# Patient Record
Sex: MALE | Race: WHITE | NOT HISPANIC OR LATINO | URBAN - METROPOLITAN AREA
[De-identification: names, ages, dates, MRNs, and addresses within clinical notes are randomized per-mention and may not be internally consistent; named-entity substitution may affect disease eponyms.]

---

## 2017-03-26 DIAGNOSIS — Z76.0 ENCOUNTER FOR MEDICATION REFILL: ICD-10-CM

## 2017-03-26 DIAGNOSIS — I10 ESSENTIAL HYPERTENSION: ICD-10-CM

## 2017-03-26 RX ORDER — ALPRAZOLAM 0.5 MG
TABLET ORAL
Qty: 90 TABLET | Refills: 0 | Status: SHIPPED | OUTPATIENT
Start: 2017-03-26 | End: 2017-04-05

## 2017-03-26 RX ORDER — LISINOPRIL 40 MG/1
TABLET ORAL
Qty: 30 TABLET | Refills: 0 | Status: SHIPPED | OUTPATIENT
Start: 2017-03-26 | End: 2017-05-05

## 2017-04-05 DIAGNOSIS — Z76.0 ENCOUNTER FOR MEDICATION REFILL: ICD-10-CM

## 2017-04-06 RX ORDER — ALPRAZOLAM 0.5 MG
TABLET ORAL
Qty: 90 TABLET | Refills: 0 | Status: SHIPPED | OUTPATIENT
Start: 2017-04-06 | End: 2017-08-01

## 2017-04-21 ENCOUNTER — OFFICE VISIT (OUTPATIENT)
Dept: FAMILY MEDICINE | Facility: CLINIC | Age: 37
End: 2017-04-21

## 2017-04-21 VITALS
WEIGHT: 160 LBS | SYSTOLIC BLOOD PRESSURE: 130 MMHG | OXYGEN SATURATION: 99 % | HEART RATE: 108 BPM | DIASTOLIC BLOOD PRESSURE: 88 MMHG | BODY MASS INDEX: 24.15 KG/M2

## 2017-04-21 DIAGNOSIS — N41.9 PROSTATITIS, UNSPECIFIED PROSTATITIS TYPE: Primary | ICD-10-CM

## 2017-04-21 LAB
BACTERIA URINE: NORMAL
BILIRUB UR QL STRIP: 0
BLOOD URINE DIP: 0
CASTS/LPF: NORMAL
COLOR UR: YELLOW
CRYSTAL URINE: NORMAL
EPITHELIAL CELLS - QUEST: NORMAL
GLUCOSE UR STRIP-MCNC: 0 MG/DL
KETONES UR QL STRIP: 0
LEUKOCYTE ESTERASE URINE DIP: 0
MUCOUS URINE: NORMAL
NITRITE UR QL STRIP: NORMAL
PH UR STRIP: 7.5 PH (ref 5–9)
PROT UR QL: 0 MG/DL (ref ?–0.01)
RBC URINE: NORMAL (ref 0–3)
SP GR UR STRIP: 1.01 (ref 1–1.02)
UROBILINOGEN UR QL STRIP: 0.2 EU/DL (ref 0.2–1)
WBC URINE: NORMAL (ref 0–3)

## 2017-04-21 PROCEDURE — 99214 OFFICE O/P EST MOD 30 MIN: CPT | Performed by: FAMILY MEDICINE

## 2017-04-21 PROCEDURE — 81003 URINALYSIS AUTO W/O SCOPE: CPT | Performed by: FAMILY MEDICINE

## 2017-04-21 RX ORDER — DOXYCYCLINE HYCLATE 100 MG
100 TABLET ORAL 2 TIMES DAILY
Qty: 20 TABLET | Refills: 0 | Status: SHIPPED | OUTPATIENT
Start: 2017-04-21 | End: 2018-02-05

## 2017-04-21 NOTE — PROGRESS NOTES
Concerned about prostatitis with 1+ day of mild low abdominal discomfort, frequent urination. Similar to 4 years ago when he had episode of prolonged discomfort, despite Levaquin. No dysuria. No fever or chills. Monogamous. Sense of perineal fullness. Father with hx of prostate cancer in his late 50's, screening.  ROS: no constipation or diarrhea  OBJECTIVE: /88  Pulse 108  Wt 72.6 kg (160 lb)  SpO2 99%  BMI 24.15 kg/m2 NAD WDWN. NAD. Abdomen is soft and perhaps slightly tender in the lower abdomen. First urine UA is normal.  (N41.9) Prostatitis, unspecified prostatitis type  (primary encounter diagnosis)  Comment: perhaps chemical  Plan: Urinalysis w/reflex protein, bili ([facility]),         doxycycline (VIBRA-TABS) 100 MG tablet        Use antibiotic if not better in a few days, for 10 days

## 2017-04-21 NOTE — MR AVS SNAPSHOT
After Visit Summary   4/21/2017    Jason Israel    MRN: [id number]           Patient Information     Date Of Birth          1980        Visit Information        Provider Department      4/21/2017 4:15 PM Bhavik Buck MD [facility]        Today's Diagnoses     Prostatitis, unspecified prostatitis type    -  1       Follow-ups after your visit        Who to contact     If you have questions or need follow up information about today's clinic visit or your schedule please contact [facility] directly at [phone].  Normal or non-critical lab and imaging results will be communicated to you by [employer], letter or phone within 4 business days after the clinic has received the results. If you do not hear from us within 7 days, please contact the clinic through [employer] or phone. If you have a critical or abnormal lab result, we will notify you by phone as soon as possible.  Submit refill requests through [employer] or call your pharmacy and they will forward the refill request to us. Please allow 3 business days for your refill to be completed.          Additional Information About Your Visit        MyChart Information     [employer] gives you secure access to your electronic health record. If you see a primary care provider, you can also send messages to your care team and make appointments. If you have questions, please call your primary care clinic.  If you do not have a primary care provider, please call [phone] and they will assist you.        Care EveryWhere ID     This is your Care EveryWhere ID. This could be used by other organizations to access your [facility] medical records  [id number]        Your Vitals Were     Pulse Pulse Oximetry BMI (Body Mass Index)             108 99% 24.15 kg/m2          Blood Pressure from Last 3 Encounters:   04/21/17 130/88   11/28/16 112/82   05/10/16 138/82    Weight from Last 3 Encounters:   04/21/17 72.6 kg (160 lb)    11/28/16 74.4 kg (164 lb)   05/10/16 79.4 kg (175 lb)              We Performed the Following     Urinalysis w/reflex protein, bili ([facility])          Today's Medication Changes          These changes are accurate as of: 4/21/17  5:45 PM.  If you have any questions, ask your nurse or doctor.               Start taking these medicines.        Dose/Directions    doxycycline 100 MG tablet   Commonly known as:  VIBRA-TABS   Used for:  Prostatitis, unspecified prostatitis type   Started by:  Bhavik Buck MD        Dose:  100 mg   Take 1 tablet (100 mg) by mouth 2 times daily   Quantity:  20 tablet   Refills:  0            Where to get your medicines      These medications were sent to [facility] [address] [place] [address] & [address], [place] [address]     Phone:  [phone]     doxycycline 100 MG tablet                Primary Care Provider Office Phone # Fax #    Hemant Florian [phone]       [facility] [facility] [address] [place] [address]        Thank you!     Thank you for choosing [facility]  for your care. Our goal is always to provide you with excellent care. Hearing back from our patients is one way we can continue to improve our services. Please take a few minutes to complete the written survey that you may receive in the mail after your visit with us. Thank you!             Your Updated Medication List - Protect others around you: Learn how to safely use, store and throw away your medicines at www.disposemymeds.org.          This list is accurate as of: 4/21/17  5:45 PM.  Always use your most recent med list.                   Brand Name Dispense Instructions for use    ALPRAZolam 0.5 MG tablet    XANAX    90 tablet    TAKE 1 TABLET BY MOUTH THREE TIMES DAILY AS NEEDED FOR ANXIETY       atorvastatin 10 MG tablet    LIPITOR    90 tablet    TAKE 1 TABLET BY MOUTH EVERY EVENING        doxycycline 100 MG tablet    VIBRA-TABS    20 tablet    Take 1 tablet (100 mg) by mouth 2 times daily       lisinopril 40 MG tablet    PRINIVIL/ZESTRIL    30 tablet    TAKE 1 TABLET(40 MG) BY MOUTH DAILY

## 2017-05-05 DIAGNOSIS — I10 ESSENTIAL HYPERTENSION: ICD-10-CM

## 2017-05-05 RX ORDER — LISINOPRIL 40 MG/1
TABLET ORAL
Qty: 30 TABLET | Refills: 0 | Status: SHIPPED | OUTPATIENT
Start: 2017-05-05 | End: 2017-06-12

## 2017-06-12 DIAGNOSIS — I10 ESSENTIAL HYPERTENSION: ICD-10-CM

## 2017-06-12 RX ORDER — LISINOPRIL 40 MG/1
TABLET ORAL
Qty: 30 TABLET | Refills: 0 | Status: SHIPPED | OUTPATIENT
Start: 2017-06-12 | End: 2017-07-23

## 2017-07-23 DIAGNOSIS — I10 ESSENTIAL HYPERTENSION: ICD-10-CM

## 2017-07-23 RX ORDER — LISINOPRIL 40 MG/1
TABLET ORAL
Qty: 30 TABLET | Refills: 0 | Status: SHIPPED | OUTPATIENT
Start: 2017-07-23 | End: 2017-08-25

## 2017-08-01 DIAGNOSIS — Z76.0 ENCOUNTER FOR MEDICATION REFILL: ICD-10-CM

## 2017-08-03 RX ORDER — ALPRAZOLAM 0.5 MG
TABLET ORAL
Qty: 90 TABLET | Refills: 0 | Status: SHIPPED | OUTPATIENT
Start: 2017-08-03 | End: 2017-12-07

## 2017-08-25 DIAGNOSIS — I10 ESSENTIAL HYPERTENSION: ICD-10-CM

## 2017-08-25 RX ORDER — LISINOPRIL 40 MG/1
TABLET ORAL
Qty: 30 TABLET | Refills: 0 | Status: SHIPPED | OUTPATIENT
Start: 2017-08-25 | End: 2017-09-27

## 2017-09-15 DIAGNOSIS — E78.00 HYPERCHOLESTEROLEMIA: ICD-10-CM

## 2017-09-15 RX ORDER — ATORVASTATIN CALCIUM 10 MG/1
TABLET, FILM COATED ORAL
Qty: 90 TABLET | Refills: 0 | Status: SHIPPED | OUTPATIENT
Start: 2017-09-15 | End: 2018-01-24

## 2017-09-27 DIAGNOSIS — I10 ESSENTIAL HYPERTENSION: ICD-10-CM

## 2017-09-27 RX ORDER — LISINOPRIL 40 MG/1
TABLET ORAL
Qty: 30 TABLET | Refills: 0 | Status: SHIPPED | OUTPATIENT
Start: 2017-09-27 | End: 2017-11-05

## 2017-09-27 NOTE — TELEPHONE ENCOUNTER
BP Medication refill        BP Readings from Last 3 Encounters:   04/21/17 130/88   11/28/16 112/82   05/10/16 138/82         BMP within 6 months? Labs are due  Last Basic Metabolic Panel:  Lab Results   Component Value Date     05/10/2016      Lab Results   Component Value Date    POTASSIUM 4.4 05/10/2016     Lab Results   Component Value Date    CHLORIDE 100 05/10/2016     Lab Results   Component Value Date    SHANICE 9.6 05/10/2016     No results found for: CO2  Lab Results   Component Value Date    BUN 12 05/10/2016    BUN 12 05/10/2016     Lab Results   Component Value Date    CR 0.97 05/10/2016     Lab Results   Component Value Date    GLC 82 05/10/2016

## 2017-10-09 DIAGNOSIS — I10 ESSENTIAL HYPERTENSION: ICD-10-CM

## 2017-10-09 PROCEDURE — 36415 COLL VENOUS BLD VENIPUNCTURE: CPT | Performed by: FAMILY MEDICINE

## 2017-10-09 PROCEDURE — 80048 BASIC METABOLIC PNL TOTAL CA: CPT | Mod: 90 | Performed by: FAMILY MEDICINE

## 2017-10-10 LAB
BUN SERPL-MCNC: 13 MG/DL (ref 6–20)
BUN/CREATININE RATIO: 15 (ref 9–20)
CALCIUM SERPL-MCNC: 9.5 MG/DL (ref 8.7–10.2)
CHLORIDE SERPLBLD-SCNC: 99 MMOL/L (ref 96–106)
CREAT SERPL-MCNC: 0.86 MG/DL (ref 0.76–1.27)
EGFR IF AFRICN AM: 128 ML/MIN/1.73
EGFR IF NONAFRICN AM: 111 ML/MIN/1.73
GLUCOSE SERPL-MCNC: 100 MG/DL (ref 65–99)
POTASSIUM SERPL-SCNC: 4.3 MMOL/L (ref 3.5–5.2)
SODIUM SERPL-SCNC: 140 MMOL/L (ref 134–144)
TOTAL CO2: 26 MMOL/L (ref 18–28)

## 2017-11-05 DIAGNOSIS — I10 ESSENTIAL HYPERTENSION: ICD-10-CM

## 2017-11-06 RX ORDER — LISINOPRIL 40 MG/1
TABLET ORAL
Qty: 30 TABLET | Refills: 0 | Status: SHIPPED | OUTPATIENT
Start: 2017-11-06 | End: 2018-02-05

## 2017-12-07 DIAGNOSIS — Z76.0 ENCOUNTER FOR MEDICATION REFILL: ICD-10-CM

## 2017-12-07 DIAGNOSIS — F41.1 GENERALIZED ANXIETY DISORDER: Primary | ICD-10-CM

## 2017-12-07 RX ORDER — ALPRAZOLAM 0.5 MG
TABLET ORAL
Qty: 90 TABLET | Refills: 0 | Status: SHIPPED | OUTPATIENT
Start: 2017-12-07 | End: 2018-04-11

## 2017-12-07 NOTE — TELEPHONE ENCOUNTER
ALPRAZolam (XANAX) 0.5 MG    LAST  Med check 5/9/16   last labs(for RX) 10/9/17  Next  appt scheduled =  none  Nataly Garcia MA

## 2017-12-10 DIAGNOSIS — I10 ESSENTIAL HYPERTENSION: ICD-10-CM

## 2017-12-10 RX ORDER — LISINOPRIL 40 MG/1
TABLET ORAL
Qty: 30 TABLET | Refills: 0 | Status: SHIPPED | OUTPATIENT
Start: 2017-12-10 | End: 2018-01-24

## 2018-01-24 DIAGNOSIS — I10 ESSENTIAL HYPERTENSION: ICD-10-CM

## 2018-01-24 DIAGNOSIS — E78.00 HYPERCHOLESTEROLEMIA: ICD-10-CM

## 2018-01-24 NOTE — TELEPHONE ENCOUNTER
lisinopril (PRINIVIL/ZESTRIL) 40 MG   ,atorvastatin (LIPITOR) 10 MG    LAST  Med check 5/10/16   last labs(for RX) 10/9/16 & 5/10/16  Next  appt scheduled =  None -due  Nataly Garcia MA    BP Readings from Last 3 Encounters:   04/21/17 130/88   11/28/16 112/82   05/10/16 138/82     Recent Labs   Lab Test  05/10/16   1221  07/07/14   0935   CHOL  214*  261*   HDL  40  43   LDL  129*  162*   TRIG  223*  282*     Last Basic Metabolic Panel:  Lab Results   Component Value Date     10/09/2017      Lab Results   Component Value Date    POTASSIUM 4.3 10/09/2017     Lab Results   Component Value Date    CHLORIDE 99 10/09/2017     Lab Results   Component Value Date    SHANICE 9.5 10/09/2017     No results found for: CO2  Lab Results   Component Value Date    BUN 13 10/09/2017    BUN 15 10/09/2017     Lab Results   Component Value Date    CR 0.86 10/09/2017     Lab Results   Component Value Date     10/09/2017

## 2018-01-25 DIAGNOSIS — I10 ESSENTIAL HYPERTENSION: ICD-10-CM

## 2018-01-25 RX ORDER — LISINOPRIL 40 MG/1
TABLET ORAL
Qty: 30 TABLET | Refills: 1 | Status: SHIPPED | OUTPATIENT
Start: 2018-01-25 | End: 2018-01-25

## 2018-01-25 RX ORDER — ATORVASTATIN CALCIUM 10 MG/1
TABLET, FILM COATED ORAL
Qty: 90 TABLET | Refills: 0 | Status: SHIPPED | OUTPATIENT
Start: 2018-01-25 | End: 2018-02-05

## 2018-01-25 RX ORDER — LISINOPRIL 40 MG/1
TABLET ORAL
Qty: 90 TABLET | Refills: 1 | Status: SHIPPED | OUTPATIENT
Start: 2018-01-25 | End: 2018-02-05

## 2018-01-25 NOTE — TELEPHONE ENCOUNTER
lisinopril (PRINIVIL/ZESTRIL) 40 MG    LAST  Med check 5/10/17   last labs(for RX) 10/9/17  Next  appt scheduled =  none  Nataly Garcia MA    BP Readings from Last 3 Encounters:   04/21/17 130/88   11/28/16 112/82   05/10/16 138/82     Last Basic Metabolic Panel:  Lab Results   Component Value Date     10/09/2017      Lab Results   Component Value Date    POTASSIUM 4.3 10/09/2017     Lab Results   Component Value Date    CHLORIDE 99 10/09/2017     Lab Results   Component Value Date    SHANICE 9.5 10/09/2017     No results found for: CO2  Lab Results   Component Value Date    BUN 13 10/09/2017    BUN 15 10/09/2017     Lab Results   Component Value Date    CR 0.86 10/09/2017     Lab Results   Component Value Date     10/09/2017

## 2018-02-05 ENCOUNTER — OFFICE VISIT (OUTPATIENT)
Dept: FAMILY MEDICINE | Facility: CLINIC | Age: 38
End: 2018-02-05

## 2018-02-05 VITALS
SYSTOLIC BLOOD PRESSURE: 132 MMHG | DIASTOLIC BLOOD PRESSURE: 78 MMHG | HEIGHT: 68 IN | HEART RATE: 90 BPM | TEMPERATURE: 97.9 F | BODY MASS INDEX: 25.76 KG/M2 | WEIGHT: 170 LBS | RESPIRATION RATE: 18 BRPM

## 2018-02-05 DIAGNOSIS — I10 ESSENTIAL HYPERTENSION: ICD-10-CM

## 2018-02-05 DIAGNOSIS — E78.00 HYPERCHOLESTEREMIA: ICD-10-CM

## 2018-02-05 DIAGNOSIS — Z00.00 ROUTINE GENERAL MEDICAL EXAMINATION AT A HEALTH CARE FACILITY: Primary | ICD-10-CM

## 2018-02-05 DIAGNOSIS — Z80.42 FAMILY HISTORY OF MALIGNANT NEOPLASM OF PROSTATE: ICD-10-CM

## 2018-02-05 DIAGNOSIS — F41.1 GENERALIZED ANXIETY DISORDER: ICD-10-CM

## 2018-02-05 LAB
% GRANULOCYTES: 64.6 % (ref 42.2–75.2)
HCT VFR BLD AUTO: 50.5 % (ref 39–51)
HEMOGLOBIN: 16.7 G/DL (ref 13.4–17.5)
LYMPHOCYTES NFR BLD AUTO: 27.3 % (ref 20.5–51.1)
MCH RBC QN AUTO: 29.4 PG (ref 27–31)
MCHC RBC AUTO-ENTMCNC: 33.1 G/DL (ref 33–37)
MCV RBC AUTO: 88.8 FL (ref 80–100)
MONOCYTES NFR BLD AUTO: 8.1 % (ref 1.7–9.3)
PLATELET # BLD AUTO: 198 K/UL (ref 140–450)
RBC # BLD AUTO: 5.68 X10/CMM (ref 4.2–5.9)
WBC # BLD AUTO: 7.2 X10/CMM (ref 3.8–11)

## 2018-02-05 PROCEDURE — 85025 COMPLETE CBC W/AUTO DIFF WBC: CPT | Performed by: FAMILY MEDICINE

## 2018-02-05 PROCEDURE — 99395 PREV VISIT EST AGE 18-39: CPT | Performed by: FAMILY MEDICINE

## 2018-02-05 PROCEDURE — 80053 COMPREHEN METABOLIC PANEL: CPT | Mod: 90 | Performed by: FAMILY MEDICINE

## 2018-02-05 PROCEDURE — 80061 LIPID PANEL: CPT | Mod: 90 | Performed by: FAMILY MEDICINE

## 2018-02-05 PROCEDURE — 99213 OFFICE O/P EST LOW 20 MIN: CPT | Mod: 25 | Performed by: FAMILY MEDICINE

## 2018-02-05 PROCEDURE — 36415 COLL VENOUS BLD VENIPUNCTURE: CPT | Performed by: FAMILY MEDICINE

## 2018-02-05 PROCEDURE — 84153 ASSAY OF PSA TOTAL: CPT | Mod: 90 | Performed by: FAMILY MEDICINE

## 2018-02-05 RX ORDER — ATORVASTATIN CALCIUM 10 MG/1
TABLET, FILM COATED ORAL
Qty: 90 TABLET | Refills: 3 | Status: SHIPPED | OUTPATIENT
Start: 2018-02-05 | End: 2019-01-07

## 2018-02-05 RX ORDER — LISINOPRIL 40 MG/1
40 TABLET ORAL DAILY
Qty: 90 TABLET | Refills: 3 | Status: SHIPPED | OUTPATIENT
Start: 2018-02-05 | End: 2019-01-07

## 2018-02-05 NOTE — PROGRESS NOTES
"SUBJECTIVE:   CC: Jason Israel is an 37 year old male who presents for preventative health visit.     Healthy Habits:    Do you get at least three servings of calcium containing foods daily (dairy, green leafy vegetables, etc.)? yes    Amount of exercise or daily activities, outside of work: none    Problems taking medications regularly No    Medication side effects: No    Have you had an eye exam in the past two years? yes    Do you see a dentist twice per year? yes    Do you have sleep apnea, excessive snoring or daytime drowsiness? A little snoring       PROBLEMS TO ADD ON...  -------------------------------------  Blood presure remains well controlled when checked out of clinic.    Reviewed last 6 BP readings in chart:  BP Readings from Last 6 Encounters:   02/05/18 132/78   04/21/17 130/88   11/28/16 112/82   05/10/16 138/82   07/07/14 128/82   05/28/13 128/72       he has not experienced any significant side effects from medications for hypertension.    NO active cardiac complaints or symptoms with exercise.  Has history of hyperlipidemia.  On statin for this, denies any significant side effects of this medication.      Latest labs reviewed:    Recent Labs   Lab Test  05/10/16   1221  07/07/14   0935   CHOL  214*  261*   HDL  40  43   LDL  129*  162*   TRIG  223*  282*        Lab Results   Component Value Date    AST 18 05/10/2016      ANXIETY:  Has known history of anxiety and has been on medication for this.  The patient does not report any significant side effects of this medication.  The prior symptoms leading to the original diagnosis and decision to start medication therapy are better.     Appetite is stable.  Sleeping patterns are stable.    Overall, the level of \"racing thoughts\", emotional lability, and ease of agitation are better.    No recent panic attacks.    No flowsheet data found.      Today's PHQ-2 Score:   PHQ-2 ( 1999 [employer]) 2/5/2018 7/7/2014   Q1: Little interest or pleasure in doing " things 0 0   Q2: Feeling down, depressed or hopeless 0 0   PHQ-2 Score 0 0       Abuse: Current or Past(Physical, Sexual or Emotional)- No  Do you feel safe in your environment - Yes    Social History   Substance Use Topics     Smoking status: Never Smoker     Smokeless tobacco: Never Used     Alcohol use 0.5 oz/week     1 Cans of beer per week      Comment: 0-1 week      If you drink alcohol do you typically have >3 drinks per day or >7 drinks per week? No                      Last PSA: No results found for: PSA    Reviewed orders with patient. Reviewed health maintenance and updated orders accordingly - Yes  Patient Active Problem List   Diagnosis     Generalized anxiety disorder     Chronic rhinitis     Family history of prostate cancer     Nevus     Essential hypertension     Past Surgical History:   Procedure Laterality Date     FINGER SURGERY      age 2       Social History   Substance Use Topics     Smoking status: Never Smoker     Smokeless tobacco: Never Used     Alcohol use 0.5 oz/week     1 Cans of beer per week      Comment: 0-1 week     Family History   Problem Relation Age of Onset     Hypertension Father      Prostate Cancer Father 57     Hypertension Mother      C.A.D. Paternal Grandmother      Neurologic Disorder Maternal Grandmother            Reviewed and updated as needed this visit by clinical staff  Tobacco  Allergies  Meds  Med Hx  Surg Hx  Fam Hx  Soc Hx        Reviewed and updated as needed this visit by Provider            ROS:  C: NEGATIVE for fever, chills, change in weight  I: NEGATIVE for worrisome rashes, moles or lesions  E: NEGATIVE for vision changes or irritation  ENT: NEGATIVE for ear, mouth and throat problems  R: NEGATIVE for significant cough or SOB  CV: NEGATIVE for chest pain, palpitations or peripheral edema  GI: NEGATIVE for nausea, abdominal pain, heartburn, or change in bowel habits   male: negative for dysuria, hematuria, decreased urinary stream, erectile  "dysfunction, urethral discharge  M: NEGATIVE for significant arthralgias or myalgia  N: NEGATIVE for weakness, dizziness or paresthesias  P: NEGATIVE for changes in mood or affect    OBJECTIVE:   /78  Pulse 90  Temp 97.9  F (36.6  C)  Resp 18  Ht 1.727 m (5' 8\")  Wt 77.1 kg (170 lb)  BMI 25.85 kg/m2  EXAM:  GENERAL: healthy, alert and no distress  EYES: Eyes grossly normal to inspection, PERRL and conjunctivae and sclerae normal  HENT: ear canals and TM's normal, nose and mouth without ulcers or lesions  NECK: no adenopathy, no asymmetry, masses, or scars and thyroid normal to palpation  RESP: lungs clear to auscultation - no rales, rhonchi or wheezes  CV: regular rate and rhythm, normal S1 S2, no S3 or S4, no murmur, click or rub, no peripheral edema and peripheral pulses strong  ABDOMEN: soft, nontender, no hepatosplenomegaly, no masses and bowel sounds normal  MS: no gross musculoskeletal defects noted, no edema  SKIN: no suspicious lesions or rashes  NEURO: Normal strength and tone, mentation intact and speech normal  PSYCH: mentation appears normal, affect normal/bright    ASSESSMENT/PLAN:   Jason was seen today for recheck medication.    Diagnoses and all orders for this visit:    Routine general medical examination at a health care facility    Essential hypertension  -     Comp. Metabolic Panel (14) ([facility])  -     CBC with Diff/Plt ([facility])  -     lisinopril (PRINIVIL/ZESTRIL) 40 MG tablet; Take 1 tablet (40 mg) by mouth daily  Discussed hypertension in detail including JNC VIII guidelines for blood pressure goals.  Discussed indication for treatment and treatment options.  Discussed the importance for aggressive management of HTN to prevent vascular complications later.  Recommended lower fat, lower carbohydrate, and lower sodium (<2000 mg)diet.  Discussed required intervals for follow up on HTN, lab studies, and the need to aggresive management of other cardiac disease risk factors.  " "Recommened pt. follow their blood pressures outside the clinic to ensure that BPs are remaining within guidelines, and to contact me if the readings are not within guidelines so we can adjust treatment as needed.    Generalized anxiety disorder  Taking just 5 days a week  Hypercholesteremia  -     Lipid Panel ([facility])  Discussed current lipid results, previous results (if available) current guidelines (NCEP) for treatment and goals for lipids.  Discussed lifestyle modification, dietary changes (low fat, low simple carb) and regular aerobic exercise.  Discussed the link between dysmetabolic syndrome and impaired glucose tolerance seen in certain patterns of lipids.  Briefly discussed medication used for lipid lowering, including the statins are their possible side effects of myalgias, rhabdomyolysis, and liver toxicity.    Family history of malignant neoplasm of prostate  -     PSA Serum ([facility])    Hypercholesterolemia  -     atorvastatin (LIPITOR) 10 MG tablet; TAKE 1 TABLET BY MOUTH EVERY EVENING        COUNSELING:  Reviewed preventive health counseling, as reflected in patient instructions       Regular exercise       Healthy diet/nutrition       reports that he has never smoked. He has never used smokeless tobacco.    Estimated body mass index is 25.85 kg/(m^2) as calculated from the following:    Height as of this encounter: 1.727 m (5' 8\").    Weight as of this encounter: 77.1 kg (170 lb).       Counseling Resources:  ATP IV Guidelines  Pooled Cohorts Equation Calculator  FRAX Risk Assessment  ICSI Preventive Guidelines  Dietary Guidelines for Americans, 2010  USDA's MyPlate  ASA Prophylaxis  Lung CA Screening    Hemant Florian MD  [facility]  "

## 2018-02-05 NOTE — MR AVS SNAPSHOT
"              After Visit Summary   2/5/2018    Jason Israel    MRN: [id number]           Patient Information     Date Of Birth          1980        Visit Information        Provider Department      2/5/2018 8:15 AM Hemant Florian MD [facility]        Today's Diagnoses     Routine general medical examination at a health care facility    -  1    Essential hypertension        Generalized anxiety disorder        Hypercholesteremia        Family history of malignant neoplasm of prostate          Care Instructions    Thanks for coming in to see me today!    Your next visit with us should be scheduled for Follow up in 6 months.    Listed next are the medical health Maintenance items we are tracking for your care and when they are next due (or overdue!)  Our goal is 100% \"up to date.\" Keep this list handy to call and schedule what you are due for in the future!    Health Maintenance   Topic Date Due     LIPID SCREEN Q5 YR MALE (SYSTEM ASSIGNED)  05/10/2021     TETANUS IMMUNIZATION (SYSTEM ASSIGNED)  07/07/2024     INFLUENZA VACCINE (SYSTEM ASSIGNED)  Completed       Can I lower my cholesterol by changing my diet? -- Yes, you can lower your cholesterol some if you avoid red meat, butter, fried foods, cheese, and other foods that have a lot of saturated fat. But if you are interested in improving your health, it s best not to focus just on cholesterol. There are changes you can make to your diet that will reduce your risk of heart disease and other problems--even if they don t lower your cholesterol much.   No single diet is right for everyone. But in general, a healthy diet can include:  Lots of fruits, vegetables, and whole grains (examples of whole grains include whole wheat, oats, and barley)   Some beans, peas, lentils, chickpeas, and similar foods   Some nuts, such as walnuts, almonds, and peanuts   Fat-free or low-fat milk and milk products   Some fish  To have a healthy diet, it s also " important to limit or avoid sugar, sweets, and refined grains. (Refined grains are found in white bread, white rice, most forms of pasta, and most packaged  snack  foods.)  What about eggs? -- Eggs are OK, but don t overdo it. The news is often littered with stories about the health benefits or risks of eggs. The truth is, eggs are a good source of protein and do not raise cholesterol much, even though they have a lot of cholesterol in them.   Are there specific foods that can lower my cholesterol? -- Maybe. There are some foods that seem to lower cholesterol, but scientists are still not sure. Here are some foods that are being studied:  Foods rich in omega 3 fatty acids - Foods rich in omega 3 fatty acids include oily fish, and olive and canola oil. These foods seem to raise good cholesterol and might lower certain types of bad cholesterol. More important, studies show that people who eat lots of these foods are less likely than those who eat less of them to have heart disease. If you want, it s fine to eat 1 to 2 servings of oily fish a week (such as salmon, herring, or tuna). If you would like to take fish oil supplements, talk to your doctor or nurse.   Nuts - Some studies show that eating certain nuts, such as walnuts and pistachios, can help lower cholesterol and even the risk of heart attack or death.   Fiber-rich foods - Fiber-rich foods, such as fruits, vegetables, beans, and oats, seem to lower cholesterol and are generally good for your health. Some doctors even recommend fiber supplements.  What about  foods that claim to lower cholesterol? -- Be careful with these foods. There are now many foods that have added plant extracts called  sterols  or  stanols.  Examples include Minute Maid HeartWise orange juice, Danacol yogurt, and special margarines such as Benecol and Promise activ. Foods with added sterols or stanols can lower cholesterol. But it s not clear whether those foods help reduce the  risk of heart attack or stroke. Plus, research in animals shows that these extracts might actually cause health problems. Experts think more research is needed before they can recommend that people eat foods with added plant sterols or stanols.   Should I take supplements to lower my cholesterol? -- Maybe. Some research has shown that certain supplements can lower cholesterol. But there is almost no research showing that supplements can help prevent heart attacks, strokes, or any of the problems caused by high cholesterol. If you decide to try supplements, keep in mind that in the [place], the government does not regulate supplements very well. That means that what s on a supplement s label is not always actually in the bottle.   Here are some supplements that might help with cholesterol:   Red yeast rice - This supplement can contain the same ingredient that is in a prescription medicine to lower cholesterol. Red yeast rice helps lower cholesterol, but the products that claim to have it might not always have much of the active ingredient.   Calcium - Some studies show that calcium supplements can lower cholesterol. But there are no studies showing that they lower the risk of heart attack or stroke. Some studies even suggest that calcium supplements can increase these risks.            Follow-ups after your visit        Who to contact     If you have questions or need follow up information about today's clinic visit or your schedule please contact [facility] directly at [phone].  Normal or non-critical lab and imaging results will be communicated to you by MyChart, letter or phone within 4 business days after the clinic has received the results. If you do not hear from us within 7 days, please contact the clinic through [employer]Coinhart or phone. If you have a critical or abnormal lab result, we will notify you by phone as soon as possible.  Submit refill requests through [employer] or call your pharmacy  "and they will forward the refill request to us. Please allow 3 business days for your refill to be completed.          Additional Information About Your Visit        [employer] Information     [employer] gives you secure access to your electronic health record. If you see a primary care provider, you can also send messages to your care team and make appointments. If you have questions, please call your primary care clinic.  If you do not have a primary care provider, please call [phone] and they will assist you.        Care EveryWhere ID     This is your Care EveryWhere ID. This could be used by other organizations to access your [place] medical records  [id number]        Your Vitals Were     Pulse Temperature Respirations Height BMI (Body Mass Index)       90 97.9  F (36.6  C) 18 1.727 m (5' 8\") 25.85 kg/m2        Blood Pressure from Last 3 Encounters:   02/05/18 132/78   04/21/17 130/88   11/28/16 112/82    Weight from Last 3 Encounters:   02/05/18 77.1 kg (170 lb)   04/21/17 72.6 kg (160 lb)   11/28/16 74.4 kg (164 lb)              We Performed the Following     CBC with Diff/Plt ([facility])     Comp. Metabolic Panel (14) ([facility])     Lipid Panel ([facility])     PSA Serum ([facility])          Today's Medication Changes          These changes are accurate as of 2/5/18  9:07 AM.  If you have any questions, ask your nurse or doctor.               These medicines have changed or have updated prescriptions.        Dose/Directions    atorvastatin 10 MG tablet   Commonly known as:  LIPITOR   This may have changed:  See the new instructions.   Used for:  Hypercholesteremia   Changed by:  Hemant Florian MD        TAKE 1 TABLET BY MOUTH EVERY EVENING   Quantity:  90 tablet   Refills:  3       lisinopril 40 MG tablet   Commonly known as:  PRINIVIL/ZESTRIL   This may have changed:  See the new instructions.   Used for:  Essential hypertension   Changed by:  Heamnt Florian MD        Dose:  40 mg   Take 1 tablet (40 mg) " by mouth daily   Quantity:  90 tablet   Refills:  3         Stop taking these medicines if you haven't already. Please contact your care team if you have questions.     doxycycline 100 MG tablet   Commonly known as:  VIBRA-TABS   Stopped by:  Hemant Florian MD                Where to get your medicines      These medications were sent to [facility] [address] [place] [address] AT [address] [place] [address] & [address]  [address] [address]     Phone:  [phone]     atorvastatin 10 MG tablet    lisinopril 40 MG tablet                Primary Care Provider Office Phone # Fax #    Hemant Florian [phone] [address] [facility] [address]        Equal Access to Services     [facility] : Hadii faby ku hadasho Soomaali, waaxda luqadaha, qaybta kaalmada adeegyada, waxay idiin hayaan bertin vasquez . So [facility] [phone].    ATENCIÓN: Si habla español, tiene a sanchez disposición servicios gratuitos de asistencia lingüística. Ll[facility] [phone].    We comply with applicable federal civil rights laws and [place] laws. We do not discriminate on the basis of race, color, national origin, age, disability, sex, sexual orientation, or gender identity.            Thank you!     Thank you for choosing [facility]  for your care. Our goal is always to provide you with excellent care. Hearing back from our patients is one way we can continue to improve our services. Please take a few minutes to complete the written survey that you may receive in the mail after your visit with us. Thank you!             Your Updated Medication List - Protect others around you: Learn how to safely use, store and throw away your medicines at www.disposemymeds.org.          This list is accurate as of 2/5/18  9:07 AM.  Always use your most recent med list.                   Brand Name Dispense Instructions for use Diagnosis    ALPRAZolam 0.5 MG tablet    XANAX     90 tablet    TAKE 1 TABLET BY MOUTH THREE TIMES DAILY AS NEEDED FOR ANXIETY    Encounter for medication refill, Generalized anxiety disorder       atorvastatin 10 MG tablet    LIPITOR    90 tablet    TAKE 1 TABLET BY MOUTH EVERY EVENING    Hypercholesteremia       lisinopril 40 MG tablet    PRINIVIL/ZESTRIL    90 tablet    Take 1 tablet (40 mg) by mouth daily    Essential hypertension

## 2018-02-05 NOTE — PATIENT INSTRUCTIONS
"Thanks for coming in to see me today!    Your next visit with us should be scheduled for Follow up in 6 months.    Listed next are the medical health Maintenance items we are tracking for your care and when they are next due (or overdue!)  Our goal is 100% \"up to date.\" Keep this list handy to call and schedule what you are due for in the future!    Health Maintenance   Topic Date Due     LIPID SCREEN Q5 YR MALE (SYSTEM ASSIGNED)  05/10/2021     TETANUS IMMUNIZATION (SYSTEM ASSIGNED)  07/07/2024     INFLUENZA VACCINE (SYSTEM ASSIGNED)  Completed       Can I lower my cholesterol by changing my diet?   Yes, you can lower your cholesterol some if you avoid red meat, butter, fried foods, cheese, and other foods that have a lot of saturated fat. But if you are interested in improving your health, it s best not to focus just on cholesterol. There are changes you can make to your diet that will reduce your risk of heart disease and other problems even if they don t lower your cholesterol much.   No single diet is right for everyone. But in general, a healthy diet can include:  Lots of fruits, vegetables, and whole grains (examples of whole grains include whole wheat, oats, and barley)   Some beans, peas, lentils, chickpeas, and similar foods   Some nuts, such as walnuts, almonds, and peanuts   Fat-free or low-fat milk and milk products   Some fish  To have a healthy diet, it s also important to limit or avoid sugar, sweets, and refined grains. (Refined grains are found in white bread, white rice, most forms of pasta, and most packaged  snack  foods.)  What about eggs?   Eggs are OK, but don t overdo it. The news is often littered with stories about the health benefits or risks of eggs. The truth is, eggs are a good source of protein and do not raise cholesterol much, even though they have a lot of cholesterol in them.   Are there specific foods that can lower my cholesterol?   Maybe. There are some foods that seem to lower " cholesterol, but scientists are still not sure. Here are some foods that are being studied:  Foods rich in omega 3 fatty acids   Foods rich in omega 3 fatty acids include oily fish, and olive and canola oil. These foods seem to raise good cholesterol and might lower certain types of bad cholesterol. More important, studies show that people who eat lots of these foods are less likely than those who eat less of them to have heart disease. If you want, it s fine to eat 1 to 2 servings of oily fish a week (such as salmon, herring, or tuna). If you would like to take fish oil supplements, talk to your doctor or nurse.   Nuts   Some studies show that eating certain nuts, such as walnuts and pistachios, can help lower cholesterol and even the risk of heart attack or death.   Fiber-rich foods   Fiber-rich foods, such as fruits, vegetables, beans, and oats, seem to lower cholesterol and are generally good for your health. Some doctors even recommend fiber supplements.  What about  foods that claim to lower cholesterol?   Be careful with these foods. There are now many foods that have added plant extracts called  sterols  or  stanols.  Examples include Min[place]id HeartWise orange juice, Danacol yogurt, and special margarines such as Benecol and Promise activ. Foods with added sterols or stanols can lower cholesterol. But it s not clear whether those foods help reduce the risk of heart attack or stroke. Plus, research in animals shows that these extracts might actually cause health problems. Experts think more research is needed before they can recommend that people eat foods with added plant sterols or stanols.   Should I take supplements to lower my cholesterol?   Maybe. Some research has shown that certain supplements can lower cholesterol. But there is almost no research showing that supplements can help prevent heart attacks, strokes, or any of the problems caused by high cholesterol. If you decide to try  supplements, keep in mind that in the [place], the government does not regulate supplements very well. That means that what s on a supplement s label is not always actually in the bottle.   Here are some supplements that might help with cholesterol:   Red yeast rice   This supplement can contain the same ingredient that is in a prescription medicine to lower cholesterol. Red yeast rice helps lower cholesterol, but the products that claim to have it might not always have much of the active ingredient.   Calcium   Some studies show that calcium supplements can lower cholesterol. But there are no studies showing that they lower the risk of heart attack or stroke. Some studies even suggest that calcium supplements can increase these risks.

## 2018-02-06 LAB
ALBUMIN SERPL-MCNC: 4.4 G/DL (ref 3.5–5.5)
ALBUMIN/GLOB SERPL: 1.8 {RATIO} (ref 1.2–2.2)
ALP SERPL-CCNC: 49 IU/L (ref 39–117)
ALT SERPL-CCNC: 26 IU/L (ref 0–44)
AST SERPL-CCNC: 20 IU/L (ref 0–40)
BILIRUB SERPL-MCNC: 0.7 MG/DL (ref 0–1.2)
BUN SERPL-MCNC: 15 MG/DL (ref 6–20)
BUN/CREATININE RATIO: 18 (ref 9–20)
CALCIUM SERPL-MCNC: 9.6 MG/DL (ref 8.7–10.2)
CHLORIDE SERPLBLD-SCNC: 102 MMOL/L (ref 96–106)
CHOLEST SERPL-MCNC: 173 MG/DL (ref 100–199)
CREAT SERPL-MCNC: 0.82 MG/DL (ref 0.76–1.27)
EGFR IF AFRICN AM: 130 ML/MIN/1.73
EGFR IF NONAFRICN AM: 112 ML/MIN/1.73
GLOBULIN, TOTAL: 2.5 G/DL (ref 1.5–4.5)
GLUCOSE SERPL-MCNC: 86 MG/DL (ref 65–99)
HDLC SERPL-MCNC: 37 MG/DL
LDL/HDL RATIO: 3.1 RATIO UNITS (ref 0–3.6)
LDLC SERPL CALC-MCNC: 116 MG/DL (ref 0–99)
POTASSIUM SERPL-SCNC: 4.8 MMOL/L (ref 3.5–5.2)
PROT SERPL-MCNC: 6.9 G/DL (ref 6–8.5)
PSA NG/ML: 0.5 NG/ML (ref 0–4)
SODIUM SERPL-SCNC: 142 MMOL/L (ref 134–144)
TOTAL CO2: 28 MMOL/L (ref 18–28)
TRIGL SERPL-MCNC: 101 MG/DL (ref 0–149)
VLDLC SERPL CALC-MCNC: 20 MG/DL (ref 5–40)

## 2018-02-06 NOTE — PROGRESS NOTES
Dear Jason,   I am writing to report that your included test results are within expected ranges. I do not suggest that we make any changes at this time.    Hemant Florian M.D.

## 2018-04-11 DIAGNOSIS — F41.1 GENERALIZED ANXIETY DISORDER: ICD-10-CM

## 2018-04-11 DIAGNOSIS — Z76.0 ENCOUNTER FOR MEDICATION REFILL: ICD-10-CM

## 2018-04-11 RX ORDER — ALPRAZOLAM 0.5 MG
TABLET ORAL
Qty: 90 TABLET | Refills: 3 | Status: SHIPPED | OUTPATIENT
Start: 2018-04-11 | End: 2018-12-18

## 2018-04-11 NOTE — TELEPHONE ENCOUNTER
ALPRAZolam (XANAX) 0.5 MG    LAST  Med check 2/5/18 cpx   last labs(for RX) 2/5/18  Next  appt scheduled =  none  Nataly Garcia MA    No flowsheet data found.  For PHQ9    No flowsheet data found.   For PATTI

## 2018-05-28 ENCOUNTER — HOSPITAL ENCOUNTER (EMERGENCY)
Facility: CLINIC | Age: 38
Discharge: HOME | End: 2018-05-28
Attending: NURSE PRACTITIONER | Admitting: NURSE PRACTITIONER
Payer: COMMERCIAL

## 2018-05-28 VITALS
SYSTOLIC BLOOD PRESSURE: 138 MMHG | BODY MASS INDEX: 24.44 KG/M2 | RESPIRATION RATE: 18 BRPM | TEMPERATURE: 99.1 F | DIASTOLIC BLOOD PRESSURE: 94 MMHG | OXYGEN SATURATION: 98 % | HEIGHT: 69 IN | HEART RATE: 97 BPM | WEIGHT: 165 LBS

## 2018-05-28 DIAGNOSIS — M79.642 PAIN OF LEFT HAND: ICD-10-CM

## 2018-05-28 DIAGNOSIS — S61.411A LACERATION OF RIGHT HAND, FOREIGN BODY PRESENCE UNSPECIFIED, INITIAL ENCOUNTER: ICD-10-CM

## 2018-05-28 DIAGNOSIS — Z23 DIPHTHERIA-TETANUS-PERTUSSIS (DTP) VACCINATION: ICD-10-CM

## 2018-05-28 PROCEDURE — 90715 TDAP VACCINE 7 YRS/> IM: CPT | Performed by: NURSE PRACTITIONER

## 2018-05-28 PROCEDURE — 25000128 H RX IP 250 OP 636: Performed by: NURSE PRACTITIONER

## 2018-05-28 PROCEDURE — 12001 RPR S/N/AX/GEN/TRNK 2.5CM/<: CPT

## 2018-05-28 PROCEDURE — 90471 IMMUNIZATION ADMIN: CPT

## 2018-05-28 PROCEDURE — 99283 EMERGENCY DEPT VISIT LOW MDM: CPT | Mod: 25

## 2018-05-28 RX ADMIN — CLOSTRIDIUM TETANI TOXOID ANTIGEN (FORMALDEHYDE INACTIVATED), CORYNEBACTERIUM DIPHTHERIAE TOXOID ANTIGEN (FORMALDEHYDE INACTIVATED), BORDETELLA PERTUSSIS TOXOID ANTIGEN (GLUTARALDEHYDE INACTIVATED), BORDETELLA PERTUSSIS FILAMENTOUS HEMAGGLUTININ ANTIGEN (FORMALDEHYDE INACTIVATED), BORDETELLA PERTUSSIS PERTACTIN ANTIGEN, AND BORDETELLA PERTUSSIS FIMBRIAE 2/3 ANTIGEN 0.5 ML: 5; 2; 2.5; 5; 3; 5 INJECTION, SUSPENSION INTRAMUSCULAR at 14:29

## 2018-05-28 ASSESSMENT — ENCOUNTER SYMPTOMS
NUMBNESS: 0
WOUND: 1
WEAKNESS: 0

## 2018-05-28 NOTE — ED PROVIDER NOTES
"  History     Chief Complaint:  Hand laceration     HPI   Jason Israel is a left handed 37 year old male who presents with a hand laceration. The patient was cutting up an avocado prior to his visit when the knife slipped and cut his right hand 2nd finger. The patient reports normal sensation, range of motion, and strength.     Allergies:  No Known Drug Allergies    Medications:   Xanax  Lipitor   Lisinopril     Past Medical History:    Clavicle Fracture  HTN Hypertension   Nevus   Generalized anxiety disorder    Past Surgical History:    Finger surgery     Family History:    Hypertension  Prostate cancer    Social History:  Smoking Status: No  Smokeless Tobacco: No  Alcohol Use: Yes  Marital Status:   [2]     Review of Systems   Skin: Positive for wound.   Neurological: Negative for weakness and numbness.     Physical Exam   Vitals:  Patient Vitals for the past 24 hrs:   BP Temp Temp src Pulse Resp SpO2 Height Weight   05/28/18 1339 (!) 154/101 99.1  F (37.3  C) Oral 97 18 98 % 1.753 m (5' 9\") 74.8 kg (165 lb)       Physical Exam  Physical Exam   Constitutional:  Sitting in bed comfortably. Bandaged to his right hand  Head: Atraumatic.  Head moves freely with normal range of motion.   Eyes: Conjunctivae pink. EOMs intact.   Neck: Normal range of motion.   Cardiovascular: Intact distal pulses: Radial pulse 2+ on the right, 2+ on the left.   Pulmonary/Chest: No respiratory distress.   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 Distal capillary refill and sensation intact. Tendon function intact.  Neurological: Oriented to person, place, and time. No focal deficits.   Skin: There is a 1.5 cm laceration noted to the right hand over the casiano aspect between the second and third mcp joints. Surrounding skin is warm with no erythema or heat to touch.       Emergency Department Course   Procedures:     Laceration Repair      LACERATION:  A simple clean 1.5 cm " laceration.    LOCATION:  Base of the right second finger    FUNCTION:  Distally sensation, circulation, motor and tendon function are intact.    ANESTHESIA:  Local using 0.5% Bupivacaine total of 2 mLs.    PREPARATION:  Irrigation with Normal Saline and Betadine.    DEBRIDEMENT:  no debridement and wound explored, no foreign body found.    CLOSURE:  Wound was closed with One Layer.  Skin closed with 2 x 5.0 Ethylon using interrupted sutures..      Interventions:  1429 Adacel 0.5 mL Intramuscular     Emergency Department Course:  Nursing notes and vitals reviewed.  I performed an exam of the patient as documented above.     2:40 PM I spoke with the patient.    I personally reviewed the laboratory results with the Patient and answered all related questions prior to discharge.    Findings and plan explained to the Patient. Patient discharged home with instructions regarding supportive care, medications, and reasons to return. The importance of close follow-up was reviewed.       Impression & Plan      Medical Decision Making:  The patient presented with a laceration. The wound was carefully evaluated and explored. No foreign bodies were noted on exploration or irrigation. The laceration was closed with sutures. There is no evidence of muscular, tendon, or bony damage with this laceration. There is also no evidence of vascular or neurologic compromise. Patients tetanus status was updated. Possible complications (infection, scarring) were reviewed with the patient. He is traveling out of the country tomorrow and had asked for an antibiotic prescription should signs of infection develop, this was given. We discussed wound care and reasons to return here. Suture removal in 10 days. He is amenable to plan.        Diagnosis:    ICD-10-CM    1. Laceration of right hand, foreign body presence unspecified, initial encounter S61.411A    2. Pain of left hand M79.642    3. Diphtheria-tetanus-pertussis (DTP) vaccination Z23         Disposition:   Discharge      Discharge Medications:  Amoxicillin-clavulanate (Augmentin) 875-125 MG per tablet 2x daily     Scribe Disclosure:  I, Tala Hunter, am serving as a scribe at 1:50 PM on 5/28/2018 to document services personally performed by Haley Barros NP, based on my observations and the provider's statements to me.   EMERGENCY DEPARTMENT       Haley Barros, REYES CNP  05/28/18 6036

## 2018-05-28 NOTE — ED AVS SNAPSHOT
Emergency Department    [address] [place] [address]    Phone:  [phone]    Fax:  [phone]                                       Jason Israel   MRN: [id number]    Department:   Emergency Department   Date of Visit:  5/28/2018           After Visit Summary Signature Page     I have received my discharge instructions, and my questions have been answered. I have discussed any challenges I see with this plan with the nurse or doctor.    ..........................................................................................................................................  Patient/Patient Representative Signature      ..........................................................................................................................................  Patient Representative Print Name and Relationship to Patient    ..................................................               ................................................  Date                                            Time    ..........................................................................................................................................  Reviewed by Signature/Title    ...................................................              ..............................................  Date                                                            Time

## 2018-05-28 NOTE — DISCHARGE INSTRUCTIONS
Return for signs or symptoms of infection such as: fever, increased redness, heat to touch, increased pain or pus-like drainage.      Keep laceration, clean, dry and covered. Dry well after showers and washing your hands. A thin layer of vaseline and a bandaid to prevent scabbing.         Extremity Laceration: Stitches, Staples, or Tape  A laceration is a cut through the skin. If it is deep, it may require stitches or staples to close so it can heal. Minor cuts may be treated with surgical tape closures, or skin glue.  X-rays may be done if something may have entered the skin through the cut. You may also need a tetanus shot if you are not up to date on this vaccine.  Home care    Follow the healthcare provider s instructions on how to care for the cut.    Wash your hands with soap and warm water before and after caring for your wound. This is to help prevent infection.    Keep the wound clean and dry. If a bandage was applied and it becomes wet or dirty, replace it. Otherwise, leave it in place for the first 24 hours, then change it once a day or as directed.    If stitches or staples were used, clean the wound daily:  ? After removing the bandage, wash the area with soap and water. Use a wet cotton swab to loosen and remove any blood or crust that forms.  ? After cleaning, keep the wound clean and dry. Talk with your healthcare provider before putting any antibiotic ointment on the wound. Reapply the bandage.    You may remove the bandage to shower as usual after the first 24 hours, but don't soak the area in water (no swimming) until the stitches or staples are removed.    If surgical tape closures were used, keep the area clean and dry. If it becomes wet, blot it dry with a towel. Let the surgical tape fall off on its own.    The healthcare provider may prescribe an antibiotic cream or ointment to prevent infection. He or she may also prescribe an antibiotic pill. Don't stop taking this medicine until you have  finished it all or the provider tells you to stop.    The provider may also prescribe medicine for pain. Follow the instructions for taking these medicines.    Don't do activities that may reopen your wound.  Follow-up care  Follow up with your healthcare provider, or as advised. Most skin wounds heal within 10 days. But an infection may sometimes occur even with proper treatment. Check the wound daily for the signs of infection listed below. Stitches and staples should be removed within 7 to14 days. If surgical tape closures were used, you may remove them after 10 days if they have not fallen off by then.   When to seek medical advice  Call your healthcare provider right away if any of these occur:    Wound bleeding not controlled by direct pressure    Signs of infection, including increasing pain in the wound, increasing wound redness or swelling, or pus or bad odor coming from the wound    Fever of 100.4 F (38 C) or higher, or as directed by your healthcare provider    Stitches or staples come apart or fall out or surgical tape falls off before 7 days    Wound edges reopen    Wound changes colors    Numbness occurs around the wound     Decreased movement around the injured area  Date Last Reviewed: 7/1/2017 2000-2017 [employer]. [address] [place] [address], [place] [address]. All rights reserved. This information is not intended as a substitute for professional medical care. Always follow your healthcare professional's instructions.

## 2018-05-28 NOTE — ED AVS SNAPSHOT
Emergency Department    [address] [facility] [address]    Phone:  [phone]    Fax:  [phone]                                       Jason Israel   MRN: [id number]    Department:   Emergency Department   Date of Visit:  5/28/2018           Patient Information     Date Of Birth          1980        Your diagnoses for this visit were:     Laceration of right hand, foreign body presence unspecified, initial encounter     Pain of left hand     Diphtheria-tetanus-pertussis (DTP) vaccination        You were seen by Haley Barros APRN CNP.      Follow-up Information     Follow up with Hemant Florian MD In 10 days.    Specialty:  Family Practice    Why:  for suture removal    Contact information:    [address] [place] [address] [phone]          Discharge Instructions       Return for signs or symptoms of infection such as: fever, increased redness, heat to touch, increased pain or pus-like drainage.      Keep laceration, clean, dry and covered. Dry well after showers and washing your hands. A thin layer of vaseline and a bandaid to prevent scabbing.         Extremity Laceration: Stitches, Staples, or Tape  A laceration is a cut through the skin. If it is deep, it may require stitches or staples to close so it can heal. Minor cuts may be treated with surgical tape closures, or skin glue.  X-rays may be done if something may have entered the skin through the cut. You may also need a tetanus shot if you are not up to date on this vaccine.  Home care    Follow the healthcare provider s instructions on how to care for the cut.    Wash your hands with soap and warm water before and after caring for your wound. This is to help prevent infection.    Keep the wound clean and dry. If a bandage was applied and it becomes wet or dirty, replace it. Otherwise, leave it in place for the first 24 hours, then change it once a day or as directed.    If stitches  or staples were used, clean the wound daily:  ? After removing the bandage, wash the area with soap and water. Use a wet cotton swab to loosen and remove any blood or crust that forms.  ? After cleaning, keep the wound clean and dry. Talk with your healthcare provider before putting any antibiotic ointment on the wound. Reapply the bandage.    You may remove the bandage to shower as usual after the first 24 hours, but don't soak the area in water (no swimming) until the stitches or staples are removed.    If surgical tape closures were used, keep the area clean and dry. If it becomes wet, blot it dry with a towel. Let the surgical tape fall off on its own.    The healthcare provider may prescribe an antibiotic cream or ointment to prevent infection. He or she may also prescribe an antibiotic pill. Don't stop taking this medicine until you have finished it all or the provider tells you to stop.    The provider may also prescribe medicine for pain. Follow the instructions for taking these medicines.    Don't do activities that may reopen your wound.  Follow-up care  Follow up with your healthcare provider, or as advised. Most skin wounds heal within 10 days. But an infection may sometimes occur even with proper treatment. Check the wound daily for the signs of infection listed below. Stitches and staples should be removed within 7 to14 days. If surgical tape closures were used, you may remove them after 10 days if they have not fallen off by then.   When to seek medical advice  Call your healthcare provider right away if any of these occur:    Wound bleeding not controlled by direct pressure    Signs of infection, including increasing pain in the wound, increasing wound redness or swelling, or pus or bad odor coming from the wound    Fever of 100.4 F (38 C) or higher, or as directed by your healthcare provider    Stitches or staples come apart or fall out or surgical tape falls off before 7 days    Wound edges  reopen    Wound changes colors    Numbness occurs around the wound     Decreased movement around the injured area  Date Last Reviewed: 7/1/2017 2000-2017 [employer]. [address] [place] [address], [place] [address]. All rights reserved. This information is not intended as a substitute for professional medical care. Always follow your healthcare professional's instructions.          24 Hour Appointment Hotline       To make an appointment at any [facility], call [phone] ([phone]). If you don't have a family doctor or clinic, we will help you find one. [facility] are conveniently located to serve the needs of you and your family.             Review of your medicines      Our records show that you are taking the medicines listed below. If these are incorrect, please call your family doctor or clinic.        Dose / Directions Last dose taken    ALPRAZolam 0.5 MG tablet   Commonly known as:  XANAX   Quantity:  90 tablet        TAKE 1 TABLET BY MOUTH THREE TIMES DAILY AS NEEDED FOR ANXIETY   Refills:  3        atorvastatin 10 MG tablet   Commonly known as:  LIPITOR   Quantity:  90 tablet        TAKE 1 TABLET BY MOUTH EVERY EVENING   Refills:  3        lisinopril 40 MG tablet   Commonly known as:  PRINIVIL/ZESTRIL   Dose:  40 mg   Quantity:  90 tablet        Take 1 tablet (40 mg) by mouth daily   Refills:  3                Orders Needing Specimen Collection     None      Pending Results     No orders found from 5/26/2018 to 5/29/2018.            Pending Culture Results     No orders found from 5/26/2018 to 5/29/2018.            Pending Results Instructions     If you had any lab results that were not finalized at the time of your Discharge, you can call the ED Lab Result RN at [phone]. You will be contacted by this team for any positive Lab results or changes in treatment. The nurses are available 7 days a week from 10A to 6:30P.  You can leave a message 24 hours per day and they  will return your call.        Test Results From Your Hospital Stay               Clinical Quality Measure: Blood Pressure Screening     Your blood pressure was checked while you were in the emergency department today. The last reading we obtained was  BP: (!) 154/101 . Please read the guidelines below about what these numbers mean and what you should do about them.  If your systolic blood pressure (the top number) is less than 120 and your diastolic blood pressure (the bottom number) is less than 80, then your blood pressure is normal. There is nothing more that you need to do about it.  If your systolic blood pressure (the top number) is 120-139 or your diastolic blood pressure (the bottom number) is 80-89, your blood pressure may be higher than it should be. You should have your blood pressure rechecked within a year by a primary care provider.  If your systolic blood pressure (the top number) is 140 or greater or your diastolic blood pressure (the bottom number) is 90 or greater, you may have high blood pressure. High blood pressure is treatable, but if left untreated over time it can put you at risk for heart attack, stroke, or kidney failure. You should have your blood pressure rechecked by a primary care provider within the next 4 weeks.  If your provider in the emergency department today gave you specific instructions to follow-up with your doctor or provider even sooner than that, you should follow that instruction and not wait for up to 4 weeks for your follow-up visit.        Thank you for choosing [facility]       Thank you for choosing [facility] for your care. Our goal is always to provide you with excellent care. Hearing back from our patients is one way we can continue to improve our services. Please take a few minutes to complete the written survey that you may receive in the mail after you visit with us. Thank you!        [employer] Information     [id number] gives you secure access to your electronic health  record. If you see a primary care provider, you can also send messages to your care team and make appointments. If you have questions, please call your primary care clinic.  If you do not have a primary care provider, please call [phone] and they will assist you.        Care EveryWhere ID     This is your Care EveryWhere ID. This could be used by other organizations to access your [facility] medical records  [id number]        Equal Access to Services     RAMA PATE : Chi Dodson, coby velasquez, kayla chavez, merary vasquez . So [facility] [phone].    ATENCIÓN: Si habla español, tiene a sanchez disposición servicios gratuitos de asistencia lingüística. Llame al [phone].    We comply with applicable federal civil rights laws and [place] laws. We do not discriminate on the basis of race, color, national origin, age, disability, sex, sexual orientation, or gender identity.            After Visit Summary       This is your record. Keep this with you and show to your community pharmacist(s) and doctor(s) at your next visit.

## 2018-12-18 DIAGNOSIS — F41.1 GENERALIZED ANXIETY DISORDER: ICD-10-CM

## 2018-12-18 DIAGNOSIS — Z76.0 ENCOUNTER FOR MEDICATION REFILL: ICD-10-CM

## 2018-12-18 RX ORDER — ALPRAZOLAM 0.5 MG
TABLET ORAL
Qty: 90 TABLET | Refills: 0 | Status: SHIPPED | OUTPATIENT
Start: 2018-12-18 | End: 2019-08-27

## 2018-12-18 NOTE — TELEPHONE ENCOUNTER
ALPRAZolam (XANAX) 0.5 MG   LAST  Med check 2/5/18   last labs(for RX) 2/5/18  Next  appt scheduled =  None- needs CSA next visit  Nataly Garcia MA

## 2019-01-07 ENCOUNTER — OFFICE VISIT (OUTPATIENT)
Dept: FAMILY MEDICINE | Facility: CLINIC | Age: 39
End: 2019-01-07

## 2019-01-07 VITALS
RESPIRATION RATE: 16 BRPM | OXYGEN SATURATION: 98 % | SYSTOLIC BLOOD PRESSURE: 124 MMHG | WEIGHT: 172.6 LBS | BODY MASS INDEX: 25.49 KG/M2 | DIASTOLIC BLOOD PRESSURE: 82 MMHG | HEART RATE: 94 BPM

## 2019-01-07 DIAGNOSIS — F41.1 GENERALIZED ANXIETY DISORDER: ICD-10-CM

## 2019-01-07 DIAGNOSIS — Z00.00 ROUTINE GENERAL MEDICAL EXAMINATION AT A HEALTH CARE FACILITY: Primary | ICD-10-CM

## 2019-01-07 DIAGNOSIS — E78.00 HYPERCHOLESTEROLEMIA: ICD-10-CM

## 2019-01-07 DIAGNOSIS — Z80.42 FAMILY HISTORY OF PROSTATE CANCER: ICD-10-CM

## 2019-01-07 DIAGNOSIS — Z76.0 ENCOUNTER FOR MEDICATION REFILL: ICD-10-CM

## 2019-01-07 DIAGNOSIS — I10 ESSENTIAL HYPERTENSION: ICD-10-CM

## 2019-01-07 DIAGNOSIS — E78.00 HYPERCHOLESTEREMIA: ICD-10-CM

## 2019-01-07 LAB
% GRANULOCYTES: 72.7 % (ref 42.2–75.2)
HCT VFR BLD AUTO: 51.8 % (ref 39–51)
HEMOGLOBIN: 16.9 G/DL (ref 13.4–17.5)
LYMPHOCYTES NFR BLD AUTO: 20.9 % (ref 20.5–51.1)
MCH RBC QN AUTO: 28.9 PG (ref 27–31)
MCHC RBC AUTO-ENTMCNC: 32.7 G/DL (ref 33–37)
MCV RBC AUTO: 88.3 FL (ref 80–100)
MONOCYTES NFR BLD AUTO: 6.4 % (ref 1.7–9.3)
PLATELET # BLD AUTO: 215 K/UL (ref 140–450)
RBC # BLD AUTO: 5.86 X10/CMM (ref 4.2–5.9)
WBC # BLD AUTO: 9.2 X10/CMM (ref 3.8–11)

## 2019-01-07 PROCEDURE — 80061 LIPID PANEL: CPT | Mod: 90 | Performed by: FAMILY MEDICINE

## 2019-01-07 PROCEDURE — 99213 OFFICE O/P EST LOW 20 MIN: CPT | Mod: 25 | Performed by: FAMILY MEDICINE

## 2019-01-07 PROCEDURE — 84153 ASSAY OF PSA TOTAL: CPT | Mod: 90 | Performed by: FAMILY MEDICINE

## 2019-01-07 PROCEDURE — 85025 COMPLETE CBC W/AUTO DIFF WBC: CPT | Performed by: FAMILY MEDICINE

## 2019-01-07 PROCEDURE — 36415 COLL VENOUS BLD VENIPUNCTURE: CPT | Performed by: FAMILY MEDICINE

## 2019-01-07 PROCEDURE — 99395 PREV VISIT EST AGE 18-39: CPT | Performed by: FAMILY MEDICINE

## 2019-01-07 PROCEDURE — 80053 COMPREHEN METABOLIC PANEL: CPT | Mod: 90 | Performed by: FAMILY MEDICINE

## 2019-01-07 RX ORDER — LISINOPRIL 40 MG/1
40 TABLET ORAL DAILY
Qty: 90 TABLET | Refills: 3 | Status: SHIPPED | OUTPATIENT
Start: 2019-01-07 | End: 2020-03-01

## 2019-01-07 RX ORDER — ATORVASTATIN CALCIUM 10 MG/1
TABLET, FILM COATED ORAL
Qty: 90 TABLET | Refills: 3 | Status: SHIPPED | OUTPATIENT
Start: 2019-01-07 | End: 2020-03-01

## 2019-01-07 ASSESSMENT — ANXIETY QUESTIONNAIRES
1. FEELING NERVOUS, ANXIOUS, OR ON EDGE: SEVERAL DAYS
GAD7 TOTAL SCORE: 11
IF YOU CHECKED OFF ANY PROBLEMS ON THIS QUESTIONNAIRE, HOW DIFFICULT HAVE THESE PROBLEMS MADE IT FOR YOU TO DO YOUR WORK, TAKE CARE OF THINGS AT HOME, OR GET ALONG WITH OTHER PEOPLE: SOMEWHAT DIFFICULT
5. BEING SO RESTLESS THAT IT IS HARD TO SIT STILL: SEVERAL DAYS
6. BECOMING EASILY ANNOYED OR IRRITABLE: NEARLY EVERY DAY
2. NOT BEING ABLE TO STOP OR CONTROL WORRYING: SEVERAL DAYS
7. FEELING AFRAID AS IF SOMETHING AWFUL MIGHT HAPPEN: MORE THAN HALF THE DAYS
3. WORRYING TOO MUCH ABOUT DIFFERENT THINGS: NOT AT ALL

## 2019-01-07 ASSESSMENT — PATIENT HEALTH QUESTIONNAIRE - PHQ9: 5. POOR APPETITE OR OVEREATING: NEARLY EVERY DAY

## 2019-01-07 NOTE — LETTER
[facility]  01/07/19    Patient: Jason Israel  YOB: 1980  Medical Record Number: [id number]  CSN: [id number]                                                                              Non-opioid Controlled Substance Agreement    I understand that my care provider has prescribed a controlled substance to help manage my condition(s). I am taking this medicine to help me function or work. I know this is strong medicine, and that it can cause serious side effects. Controlled substances can be sedating, addicting and may cause a dependency on the drug. They can affect my ability to drive or think, and cause depression. They need to be taken exactly as prescribed. Combining controlled substances with certain medicines or chemicals (such as cocaine, sedatives and tranquilizers, sleeping pills, meth) can be dangerous or even fatal. Also, if I stop controlled substances suddenly, I may have severe withdrawal symptoms.  If not helpful, I may be asked to stop them.    The risks, benefits, and side effects of these medicine(s) were explained to me. I agree that:    1. I will take part in other treatments as advised by my care team. This may be psychiatry or counseling, physical therapy, behavioral therapy, group treatment or a referral to a pain clinic. I will reduce or stop my medicine when my care team tells me to do so.  2. I will take my medicines as prescribed. I will not change the dose or schedule unless my care team tells me to. There will be no refills if I  run out early.   I may be contactedwithout warning and asked to complete a urine drug test or pill count at any time.   3. I will keep all my appointments, and understand this is part of the monitoring of controlled substances. My care team may require an office visit for EVERY controlled substance refill. If I miss appointments or don t follow instructions, my care team may stop my medicine.  4. I will not ask other providers to  prescribe controlled substances, and I will not accept controlled substances from other people. If I need another prescribed controlled substance for a new reason, I will tell my care team within 1 business day.  5. I will use one pharmacy to fill all of my controlled substance prescriptions, and it is up to me to make sure that I do not run out of my medicines on weekends or holidays. If my care team is willing to refill my controlled substance prescription without a visit, I must request refills only during office hours, refills may take up to 3 days to process, and it may take up to 5 to 7 days for my medicine to be mailed and ready at my pharmacy. Prescriptions will not be mailed anywhere except my pharmacy.    6. I am responsible for my prescriptions. If the medicine/prescription is lost or stolen, it will not be replaced. I also agree not to share controlled substance medicines with anyone.              [facility]  01/07/19  Patient:  Jason Israel  YOB: 1980  Medical Record Number: [id number]  CSN: [id number]    7. I agree to not use ANY illegal or recreational drugs. This includes marijuana, cocaine, bath salts or other drugs. I agree not to use alcohol unless my care team says I may. I agree to give urine samples whenever asked. If I don t give a urine sample, the care team may stop my medicine.    8. If I enroll in the [place] Medical Marijuana program, I will tell my care team. I will also sign an agreement to share my medical records with my care team.    9. I will bring in my list of medicines (or my medicine bottles) each time I come to the clinic.   10. I will tell my care team right away if I become pregnant or have a new medical problem treated outside of my regular clinic.  11. I understand that this medicine can affect my thinking and judgment. It may be unsafe for me to drive, use machinery and do dangerous tasks. I will not do any of these things until I know how  the medicine affects me. If my dose changes, I will wait to see how it affects me. I will contact my care team if I have concerns about medicine side effects.    I understand that if I do not follow any of the conditions above, my prescriptions or treatment may be stopped.      I agree that my provider, clinic care team, and pharmacy may work with any city, state or federal law enforcement agency that investigates the misuse, sale, or other diversion of my controlled medicine. I will allow my provider to discuss my care with or share a copy of this agreement with any other treating provider, pharmacy or emergency room where I receive care. I agree to give up (waive) any right of privacy or confidentiality with respect to these consents.   I have read this agreement and have asked questions about anything I did not understand.    ____________________________________________________    ____________  ________  Patient signature                                                         Date      Time    ____________________________________________________     ____________  ________  Witness                                                          Date      Time    ____________________________________________________  Provider signature

## 2019-01-07 NOTE — PROGRESS NOTES
"SUBJECTIVE:   CC: Jason Israel is an 38 year old male who presents for preventive health visit.     Healthy Habits:    Do you get at least three servings of calcium containing foods daily (dairy, green leafy vegetables, etc.)? yes    Amount of exercise or daily activities, outside of work: 0 day(s) per week    Problems taking medications regularly No    Medication side effects: No    Have you had an eye exam in the past two years? yes    Do you see a dentist twice per year? yes    Do you have sleep apnea, excessive snoring or daytime drowsiness?no      PROBLEMS TO ADD ON...  -------------------------------------  Blood presure remains well controlled when checked out of clinic.    Reviewed last 6 BP readings in chart:  BP Readings from Last 6 Encounters:   01/07/19 124/82   05/28/18 (!) 138/94   02/05/18 132/78   04/21/17 130/88   11/28/16 112/82   05/10/16 138/82       he has not experienced any significant side effects from medications for hypertension.    NO active cardiac complaints or symptoms with exercise.    Has history of hyperlipidemia.  On statin for this, denies any significant side effects of this medication.      Latest labs reviewed:    Recent Labs   Lab Test 02/05/18  1010 05/10/16  1221   CHOL 173 214*   HDL 37* 40   * 129*   TRIG 101 223*        Lab Results   Component Value Date    AST 20 02/05/2018      ANXIETY:  Has known history of anxiety and has been on medication for this.  The patient does not report any significant side effects of this medication.  The prior symptoms leading to the original diagnosis and decision to start medication therapy are better.     Appetite is stable.  Sleeping patterns are stable.    Overall, the level of \"racing thoughts\", emotional lability, and ease of agitation are better.    No recent panic attacks.    PATTI-7 SCORE 1/7/2019   Total Score 11         Today's PHQ-2 Score:   PHQ-2 ( 1999 [employer]) 1/7/2019 2/5/2018   Q1: Little interest or pleasure in " doing things 0 0   Q2: Feeling down, depressed or hopeless 0 0   PHQ-2 Score 0 0       Abuse: Current or Past(Physical, Sexual or Emotional)- No  Do you feel safe in your environment? Yes    Social History     Tobacco Use     Smoking status: Never Smoker     Smokeless tobacco: Never Used   Substance Use Topics     Alcohol use: Yes     Alcohol/week: 0.5 oz     Types: 1 Cans of beer per week     Comment: 0-1 week     If you drink alcohol do you typically have >3 drinks per day or >7 drinks per week? No                      Last PSA: No results found for: PSA    Reviewed orders with patient. Reviewed health maintenance and updated orders accordingly - Yes  Patient Active Problem List   Diagnosis     Generalized anxiety disorder     Chronic rhinitis     Family history of prostate cancer     Nevus     Essential hypertension     Hypercholesterolemia     Past Surgical History:   Procedure Laterality Date     FINGER SURGERY      age 2       Social History     Tobacco Use     Smoking status: Never Smoker     Smokeless tobacco: Never Used   Substance Use Topics     Alcohol use: Yes     Alcohol/week: 0.5 oz     Types: 1 Cans of beer per week     Comment: 0-1 week     Family History   Problem Relation Age of Onset     Hypertension Father      Prostate Cancer Father 57     Hypertension Mother      C.A.D. Paternal Grandmother      Neurologic Disorder Maternal Grandmother            Reviewed and updated as needed this visit by clinical staff  Tobacco  Allergies  Meds         Reviewed and updated as needed this visit by Provider        Past Medical History:   Diagnosis Date     Clavicle fracture     age 13     HTN (hypertension)       Past Surgical History:   Procedure Laterality Date     FINGER SURGERY      age 2       ROS:  CONSTITUTIONAL: NEGATIVE for fever, chills, change in weight  INTEGUMENTARY/SKIN: NEGATIVE for worrisome rashes, moles or lesions  EYES: NEGATIVE for vision changes or irritation  ENT: NEGATIVE for ear,  mouth and throat problems  RESP: NEGATIVE for significant cough or SOB  CV: NEGATIVE for chest pain, palpitations or peripheral edema  GI: NEGATIVE for nausea, abdominal pain, heartburn, or change in bowel habits   male: negative for dysuria, hematuria, decreased urinary stream, erectile dysfunction, urethral discharge  MUSCULOSKELETAL: NEGATIVE for significant arthralgias or myalgia  NEURO: NEGATIVE for weakness, dizziness or paresthesias  PSYCHIATRIC: NEGATIVE for changes in mood or affect  Hyperhidrosis,  Prostatitis in the past not sure if is feeling something now.    OBJECTIVE:   /82   Pulse 94   Resp 16   Wt 78.3 kg (172 lb 9.6 oz)   SpO2 98%   BMI 25.49 kg/m    EXAM:  GENERAL: healthy, alert and no distress  EYES: Eyes grossly normal to inspection, PERRL and conjunctivae and sclerae normal  HENT: ear canals and TM's normal, nose and mouth without ulcers or lesions  NECK: no adenopathy, no asymmetry, masses, or scars and thyroid normal to palpation  RESP: lungs clear to auscultation - no rales, rhonchi or wheezes  CV: regular rate and rhythm, normal S1 S2, no S3 or S4, no murmur, click or rub, no peripheral edema and peripheral pulses strong  ABDOMEN: soft, nontender, no hepatosplenomegaly, no masses and bowel sounds normal   (male): normal male genitalia without lesions or urethral discharge, no hernia  RECTAL: normal sphincter tone, no rectal masses, prostate normal size, smooth, nontender without nodules or masses  MS: no gross musculoskeletal defects noted, no edema  SKIN: no suspicious lesions or rashes  NEURO: Normal strength and tone, mentation intact and speech normal  PSYCH: mentation appears normal, affect normal/bright    Diagnostic Test Results:  none     ASSESSMENT/PLAN:   Jason was seen today for recheck medication and physical.    Diagnoses and all orders for this visit:    Routine general medical examination at a health care facility    Essential hypertension  Discussed  current hypertension treatment guidelines, including indications for treatment and treatment options.  Discussed the importance for aggressive management of HTN to prevent vascular complications later.  Recommended lower fat, lower carbohydrate, and lower sodium (<2000 mg)diet.  Discussed required intervals for follow up on HTN, lab studies.  Recommened pt. follow their blood pressures outside the clinic to ensure that BPs are remaining within guidelines, and to contact me if the readings are not within guidelines on a regular basis so we can adjust treatment as needed.    -     Comp. Metabolic Panel (14) ([facility])  -     CBC with Diff/Plt ([facility])  -     lisinopril (PRINIVIL/ZESTRIL) 40 MG tablet; Take 1 tablet (40 mg) by mouth daily    Generalized anxiety disorder  Spoke at length about mood disorders including suspected pathophysiology, role of neurotransmitters, and treatment options including medication options (SSRIs that treat cause of sx and Benzos which treat the sx.) and counselling.    Family history of prostate cancer  -     PSA Serum ([facility])    Hypercholesterolemia  Has been on meds for 3-4 years  Discussed current lipid results, previous results (if available) current guidelines (NCEP) for treatment and goals for lipids.  Discussed lifestyle modification, dietary changes (low fat, low simple carb) and regular aerobic exercise.  Discussed the link between dysmetabolic syndrome and impaired glucose tolerance seen in certain patterns of lipids.  Briefly discussed medication used for lipid lowering, including the statins are their possible side effects of myalgias, rhabdomyolysis, and liver toxicity.    -     Lipid Panel ([facility])    Encounter for medication refill    Hypercholesteremia  -     atorvastatin (LIPITOR) 10 MG tablet; TAKE 1 TABLET BY MOUTH EVERY EVENING        COUNSELING:  Reviewed preventive health counseling, as reflected in patient instructions       Regular exercise    BP Readings from Last  "1 Encounters:   01/07/19 124/82     Estimated body mass index is 25.49 kg/m  as calculated from the following:    Height as of 5/28/18: 1.753 m (5' 9\").    Weight as of this encounter: 78.3 kg (172 lb 9.6 oz).           reports that  has never smoked. he has never used smokeless tobacco.      Counseling Resources:  ATP IV Guidelines  Pooled Cohorts Equation Calculator  FRAX Risk Assessment  ICSI Preventive Guidelines  Dietary Guidelines for Americans, 2010  USDA's MyPlate  ASA Prophylaxis  Lung CA Screening    Hemant Florian MD  [facility]  "

## 2019-01-08 LAB
ALBUMIN SERPL-MCNC: 4.4 G/DL (ref 3.5–5.5)
ALBUMIN/GLOB SERPL: 1.7 {RATIO} (ref 1.2–2.2)
ALP SERPL-CCNC: 60 IU/L (ref 39–117)
ALT SERPL-CCNC: 22 IU/L (ref 0–44)
AST SERPL-CCNC: 16 IU/L (ref 0–40)
BILIRUB SERPL-MCNC: 0.7 MG/DL (ref 0–1.2)
BUN SERPL-MCNC: 14 MG/DL (ref 6–20)
BUN/CREATININE RATIO: 13 (ref 9–20)
CALCIUM SERPL-MCNC: 9.6 MG/DL (ref 8.7–10.2)
CHLORIDE SERPLBLD-SCNC: 101 MMOL/L (ref 96–106)
CHOLEST SERPL-MCNC: 190 MG/DL (ref 100–199)
CREAT SERPL-MCNC: 1.06 MG/DL (ref 0.76–1.27)
EGFR IF AFRICN AM: 102 ML/MIN/1.73
EGFR IF NONAFRICN AM: 88 ML/MIN/1.73
GLOBULIN, TOTAL: 2.6 G/DL (ref 1.5–4.5)
GLUCOSE SERPL-MCNC: 99 MG/DL (ref 65–99)
HDLC SERPL-MCNC: 40 MG/DL
LDL/HDL RATIO: 2.9 RATIO (ref 0–3.6)
LDLC SERPL CALC-MCNC: 114 MG/DL (ref 0–99)
POTASSIUM SERPL-SCNC: 4.7 MMOL/L (ref 3.5–5.2)
PROT SERPL-MCNC: 7 G/DL (ref 6–8.5)
PSA NG/ML: 0.5 NG/ML (ref 0–4)
SODIUM SERPL-SCNC: 141 MMOL/L (ref 134–144)
TOTAL CO2: 23 MMOL/L (ref 20–29)
TRIGL SERPL-MCNC: 179 MG/DL (ref 0–149)
VLDLC SERPL CALC-MCNC: 36 MG/DL (ref 5–40)

## 2019-01-09 ASSESSMENT — ANXIETY QUESTIONNAIRES: GAD7 TOTAL SCORE: 11

## 2019-02-15 DIAGNOSIS — I10 ESSENTIAL HYPERTENSION: ICD-10-CM

## 2019-02-15 RX ORDER — LISINOPRIL 40 MG/1
TABLET ORAL
Qty: 90 TABLET | Refills: 0 | Status: SHIPPED | OUTPATIENT
Start: 2019-02-15 | End: 2021-04-30

## 2019-08-27 DIAGNOSIS — F41.1 GENERALIZED ANXIETY DISORDER: ICD-10-CM

## 2019-08-27 DIAGNOSIS — Z76.0 ENCOUNTER FOR MEDICATION REFILL: ICD-10-CM

## 2019-08-27 RX ORDER — ALPRAZOLAM 0.5 MG
TABLET ORAL
Qty: 90 TABLET | Refills: 0 | Status: SHIPPED | OUTPATIENT
Start: 2019-08-27 | End: 2020-01-20

## 2019-09-10 DIAGNOSIS — F41.1 GENERALIZED ANXIETY DISORDER: ICD-10-CM

## 2019-09-10 DIAGNOSIS — Z76.0 ENCOUNTER FOR MEDICATION REFILL: ICD-10-CM

## 2019-09-10 RX ORDER — ALPRAZOLAM 0.5 MG
TABLET ORAL
Qty: 90 TABLET | Refills: 0 | OUTPATIENT
Start: 2019-09-10

## 2019-09-10 NOTE — TELEPHONE ENCOUNTER
9/10/19--this prescription did not print out in the office.  Rx called into the pharmacy @ [phone]--gerardo Lynch,   [facility]  [phone]

## 2020-01-20 DIAGNOSIS — Z76.0 ENCOUNTER FOR MEDICATION REFILL: ICD-10-CM

## 2020-01-20 DIAGNOSIS — F41.1 GENERALIZED ANXIETY DISORDER: ICD-10-CM

## 2020-01-20 RX ORDER — ALPRAZOLAM 0.5 MG
TABLET ORAL
Qty: 90 TABLET | Refills: 1 | Status: SHIPPED | OUTPATIENT
Start: 2020-01-20 | End: 2021-04-29

## 2020-02-29 DIAGNOSIS — I10 ESSENTIAL HYPERTENSION: ICD-10-CM

## 2020-02-29 DIAGNOSIS — E78.00 HYPERCHOLESTEREMIA: ICD-10-CM

## 2020-03-01 ENCOUNTER — HEALTH MAINTENANCE LETTER (OUTPATIENT)
Age: 40
End: 2020-03-01

## 2020-03-01 RX ORDER — ATORVASTATIN CALCIUM 10 MG/1
TABLET, FILM COATED ORAL
Qty: 90 TABLET | Refills: 3 | Status: SHIPPED | OUTPATIENT
Start: 2020-03-01 | End: 2022-06-03

## 2020-03-01 RX ORDER — LISINOPRIL 40 MG/1
TABLET ORAL
Qty: 90 TABLET | Refills: 3 | Status: SHIPPED | OUTPATIENT
Start: 2020-03-01 | End: 2021-04-29

## 2021-04-17 ENCOUNTER — HEALTH MAINTENANCE LETTER (OUTPATIENT)
Age: 41
End: 2021-04-17

## 2021-04-29 DIAGNOSIS — Z76.0 ENCOUNTER FOR MEDICATION REFILL: ICD-10-CM

## 2021-04-29 DIAGNOSIS — F41.1 GENERALIZED ANXIETY DISORDER: ICD-10-CM

## 2021-04-29 DIAGNOSIS — I10 ESSENTIAL HYPERTENSION: ICD-10-CM

## 2021-04-29 RX ORDER — ALPRAZOLAM 0.5 MG
TABLET ORAL
Qty: 90 TABLET | Refills: 1 | Status: SHIPPED | OUTPATIENT
Start: 2021-04-29 | End: 2022-09-23

## 2021-04-29 RX ORDER — LISINOPRIL 40 MG/1
40 TABLET ORAL DAILY
Qty: 90 TABLET | Refills: 3 | Status: SHIPPED | OUTPATIENT
Start: 2021-04-29 | End: 2022-05-16

## 2021-04-30 ENCOUNTER — OFFICE VISIT (OUTPATIENT)
Dept: FAMILY MEDICINE | Facility: CLINIC | Age: 41
End: 2021-04-30

## 2021-04-30 VITALS
DIASTOLIC BLOOD PRESSURE: 97 MMHG | OXYGEN SATURATION: 98 % | RESPIRATION RATE: 16 BRPM | WEIGHT: 173.5 LBS | BODY MASS INDEX: 25.7 KG/M2 | HEART RATE: 100 BPM | HEIGHT: 69 IN | TEMPERATURE: 97.8 F | SYSTOLIC BLOOD PRESSURE: 150 MMHG

## 2021-04-30 DIAGNOSIS — F41.1 GENERALIZED ANXIETY DISORDER: ICD-10-CM

## 2021-04-30 DIAGNOSIS — I10 ESSENTIAL HYPERTENSION: Primary | ICD-10-CM

## 2021-04-30 DIAGNOSIS — Z12.5 SCREENING FOR PROSTATE CANCER: ICD-10-CM

## 2021-04-30 DIAGNOSIS — E78.00 HYPERCHOLESTEREMIA: ICD-10-CM

## 2021-04-30 LAB
% GRANULOCYTES: 56 % (ref 42.2–75.2)
HCT VFR BLD AUTO: 48.4 % (ref 39–51)
HEMOGLOBIN: 16.8 G/DL (ref 13.4–17.5)
LYMPHOCYTES NFR BLD AUTO: 35.1 % (ref 20.5–51.1)
MCH RBC QN AUTO: 29.9 PG (ref 27–31)
MCHC RBC AUTO-ENTMCNC: 34.8 G/DL (ref 33–37)
MCV RBC AUTO: 86 FL (ref 80–100)
MONOCYTES NFR BLD AUTO: 8.9 % (ref 1.7–9.3)
PLATELET # BLD AUTO: 213 K/UL (ref 140–450)
RBC # BLD AUTO: 5.62 X10/CMM (ref 4.2–5.9)
WBC # BLD AUTO: 5.6 X10/CMM (ref 3.8–11)

## 2021-04-30 PROCEDURE — 85025 COMPLETE CBC W/AUTO DIFF WBC: CPT | Performed by: FAMILY MEDICINE

## 2021-04-30 PROCEDURE — 93050 ART PRESSURE WAVEFORM ANALYS: CPT | Performed by: FAMILY MEDICINE

## 2021-04-30 PROCEDURE — 36415 COLL VENOUS BLD VENIPUNCTURE: CPT | Performed by: FAMILY MEDICINE

## 2021-04-30 PROCEDURE — 99214 OFFICE O/P EST MOD 30 MIN: CPT | Performed by: FAMILY MEDICINE

## 2021-04-30 ASSESSMENT — MIFFLIN-ST. JEOR: SCORE: 1682.37

## 2021-04-30 NOTE — PROGRESS NOTES
Problem(s) Oriented visit        SUBJECTIVE:                                                    Jason Israel is a 40 year old male who presents to clinic today for the following health issues :    1. Essential hypertension  Essential Hypertension   Remains well controlled when checked out of clinic.   he has not experienced any significant side effects from medications for hypertension.    NO active cardiac complaints or symptoms with exercise.  Current medications for treatment:  ACE inhibitors (Lisinopril, Ramipril,Captopril,Benazepril)    Reviewed last 6 BP readings in chart:  BP Readings from Last 6 Encounters:   04/30/21 (!) 150/97   01/07/19 124/82   05/28/18 (!) 138/94   02/05/18 132/78   04/21/17 130/88   11/28/16 112/82         - Comp. Metabolic Panel (14) ([facility])  - CBC with Diff/Plt ([facility])  - MS ART PRESS WAVEFORM ANALYS CENTRAL ART PRESSURE    2. Hypercholesteremia  Hyperlipidemia:  Has history of hyperlipidemia.    The patient is  taking a medication for this.  Denies any significant side effects from his medication.      Latest labs reviewed:    Recent Labs   Lab Test 01/07/19  0815 02/05/18  1010   CHOL 190 173   HDL 40 37*   * 116*   TRIG 179* 101        Lab Results   Component Value Date    AST 16 01/07/2019        - Lipid Panel ([facility])    3. Screening for prostate cancer  due  - PSA Serum ([facility])       Problem list, Medication list, Allergies, and Medical/Social/Surgical histories reviewed in [facility] and updated as appropriate.   Additional history: as documented    ROS:  General and Resp. completed and negative except as noted above    Histories:   Patient Active Problem List   Diagnosis     Generalized anxiety disorder     Chronic rhinitis     Family history of prostate cancer     Nevus     Essential hypertension     Hypercholesterolemia     Past Surgical History:   Procedure Laterality Date     FINGER SURGERY      age 2       Social History     Tobacco Use     Smoking status: Never  "Smoker     Smokeless tobacco: Never Used   Substance Use Topics     Alcohol use: Yes     Alcohol/week: 0.8 standard drinks     Types: 1 Cans of beer per week     Comment: 0-1 week     Family History   Problem Relation Age of Onset     Hypertension Father      Prostate Cancer Father 57     Cancer Father      Hypertension Mother      C.A.D. Paternal Grandmother      No Known Problems Brother      No Known Problems Brother      No Known Problems Son      Neurologic Disorder Maternal Grandmother            OBJECTIVE:                                                    BP (!) 150/97   Pulse 100   Temp 97.8  F (36.6  C)   Resp 16   Ht 1.753 m (5' 9\")   Wt 78.7 kg (173 lb 8 oz)   SpO2 98%   BMI 25.62 kg/m    Body mass index is 25.62 kg/m .   APPEARANCE: = Relaxed and in no distress  Conj/Eyelids = noninjected and lids and lashes are without inflammation  PERRLA/Irises = Pupils Round Reactive to Light and Irisis without inflammation  Neck = No anterior or posterior adenopathy appreciated.  Thyroid = Not enlarged and no masses felt  Resp effort = Calm regular breathing  Breath Sounds = Good air movement with no rales or rhonchi in any lung fields  Heart Rate, Rhythm, & sounds (no Murm)  = Regular rate and rhythm with no S3, S4, or murmur appreciated.  Carotid Art's = Pulses full and equal and no bruits appreciated  Abdomen = Soft, nontender, no masses, & bowel sounds in all quadrants  Liver/Spleen = Normal span and no splenomegaly noted  Digits and Nails = FROM in all finger joints, no nail dystrophy  Ext (edema) = No pretibial edema noted or elsewhere  Musculsktl =  Strength and ROM of major joints are within normal limits  SKIN = absent significant rashes or lesions   Recent/Remote Memory = Alert and Oriented x 3  Mood/Affect = Cooperative and interested     ASSESSMENT/PLAN:                                                        Jason was seen today for recheck medication.    Diagnoses and all orders for this " visit:    Essential hypertension  Reviewed his current HTN management. Discussed our goal for him is a systolic pressure at or below 128 and diastolic pressure at or below 83.  We today managed his prescriptions with refills ensured to ensure availabilty of current medications.  Discussed the importance for aggressive management of HTN to prevent vascular complications later.  Recommended lower fat, lower carbohydrate, and lower sodium (<2000 mg)diet. Required intervals for follow up on HTN, lab studies reviewed.    Strongly recommened he follow his blood pressures outside the clinic to ensure that BPs are remaining within guidelines,.  Instructed to contact me if the readings are not within guidelines on a regular basis so we can adjust treatment as needed.    -     Comp. Metabolic Panel (14) ([facility])  -     CBC with Diff/Plt ([facility])  -     MA ART PRESS WAVEFORM ANALYS CENTRAL ART PRESSURE    Hypercholesteremia  Hyperlipidemia  Discussed current lipid results, previous results (if available) current guidelines (NCEP) for treatment and goals for lipids.    Discussed ongoing lifestyle modification, dietary changes (low fat, low simple carb) and regular aerobic exercise.    Discussed the link between dysmetabolic syndrome and impaired glucose tolerance seen in certain patterns of lipids.     Reviewed medication use for lipid lowering, including the statins are their possible side effects of myalgias, rhabdomyolysis, and liver toxicity.  We today managed his prescriptions with refills ensured to ensure availabilty of current medications.  Discussed the importance for aggressive management of dyslipidemia to prevent vascular complications later.     Instructed to contact me if he develop any intolerance to the treatment.    -     Lipid Panel ([facility])    Screening for prostate cancer  -     PSA Serum ([facility])    Generalized anxiety disorder  Reviewed the status of his anxiety management status at length.   he is  satisfied with his current treatment including management of his difficulty with Feeling nervous or on edge.  Discussed the current specific treatment including the possible side effects from all current medications and the fact that if he is utilizing controlled substances, those meds require special handling of prescriptions and our need for close monitoring including regular follow up.    he desires to continue the current medication and agrees to the current schedule for visits every 6 month(s).  I reminded him that any noncompliance with regard to prescriptions or follow up may result in my declining to provide further refills of these medications.  We also ansered any questions about mood disorders including suspected pathophysiology, role of neurotransmitters, and treatment options including medication options (SSRIs that treat cause of sx and Benzos which treat the sx.) and counselling.          Regular exercise  Patient Instructions     Hypertension   What is hypertension?   Hypertension is blood pressure that keeps being higher than normal. Blood pressure is the force of blood against artery walls as the heart pumps blood through the body. Blood pressure can be unhealthy if it is above 120/80. The higher your blood pressure, the greater the health risk.   High blood pressure can be controlled if you take these steps:   Maintain a healthy weight.   Are physically active.   Follow a healthy eating plan, which includes foods that do not have a lot of salt and sodium.   Do not drink a lot of alcohol.   Our goal is to keep the systolic (top) number 128 or lower and the diastolic (bottom) number 83 or lower, please regularly track your BP at home!          The following health maintenance items are reviewed in Epic and correct as of today:  Health Maintenance   Topic Date Due     ADVANCE CARE PLANNING  Never done     HIV SCREENING  Never done     HEPATITIS C SCREENING  Never done     PREVENTIVE CARE VISIT   01/07/2020     LIPID  01/07/2024     DTAP/TDAP/TD IMMUNIZATION (3 - Td) 05/28/2028     PHQ-2  Completed     INFLUENZA VACCINE  Completed     COVID-19 Vaccine  Completed     Pneumococcal Vaccine: Pediatrics (0 to 5 Years) and At-Risk Patients (6 to 64 Years)  Aged Out     IPV IMMUNIZATION  Aged Out     MENINGITIS IMMUNIZATION  Aged Out     HEPATITIS B IMMUNIZATION  Aged Out       Hemant Florian MD  [facility]  Family Practice  [facility]  [phone]    For any issues my office # is [phone]

## 2021-04-30 NOTE — PATIENT INSTRUCTIONS
Hypertension   What is hypertension?   Hypertension is blood pressure that keeps being higher than normal. Blood pressure is the force of blood against artery walls as the heart pumps blood through the body. Blood pressure can be unhealthy if it is above 120/80. The higher your blood pressure, the greater the health risk.   High blood pressure can be controlled if you take these steps:   Maintain a healthy weight.   Are physically active.   Follow a healthy eating plan, which includes foods that do not have a lot of salt and sodium.   Do not drink a lot of alcohol.   Our goal is to keep the systolic (top) number 128 or lower and the diastolic (bottom) number 83 or lower, please regularly track your BP at home!

## 2021-05-01 LAB
ALBUMIN SERPL-MCNC: 4.6 G/DL (ref 4–5)
ALBUMIN/GLOB SERPL: 1.9 {RATIO} (ref 1.2–2.2)
ALP SERPL-CCNC: 56 IU/L (ref 39–117)
ALT SERPL-CCNC: 25 IU/L (ref 0–44)
AST SERPL-CCNC: 17 IU/L (ref 0–40)
BILIRUB SERPL-MCNC: 0.7 MG/DL (ref 0–1.2)
BUN SERPL-MCNC: 15 MG/DL (ref 6–24)
BUN/CREATININE RATIO: 15 (ref 9–20)
CALCIUM SERPL-MCNC: 9.4 MG/DL (ref 8.7–10.2)
CHLORIDE SERPLBLD-SCNC: 102 MMOL/L (ref 96–106)
CHOLEST SERPL-MCNC: 191 MG/DL (ref 100–199)
CREAT SERPL-MCNC: 1.03 MG/DL (ref 0.76–1.27)
EGFR IF AFRICN AM: 104 ML/MIN/1.73
EGFR IF NONAFRICN AM: 90 ML/MIN/1.73
GLOBULIN, TOTAL: 2.4 G/DL (ref 1.5–4.5)
GLUCOSE SERPL-MCNC: 90 MG/DL (ref 65–99)
HDLC SERPL-MCNC: 36 MG/DL
LDL/HDL RATIO: 3.5 RATIO (ref 0–3.6)
LDLC SERPL CALC-MCNC: 125 MG/DL (ref 0–99)
POTASSIUM SERPL-SCNC: 4.2 MMOL/L (ref 3.5–5.2)
PROT SERPL-MCNC: 7 G/DL (ref 6–8.5)
PSA NG/ML: 0.3 NG/ML (ref 0–4)
SODIUM SERPL-SCNC: 140 MMOL/L (ref 134–144)
TOTAL CO2: 25 MMOL/L (ref 20–29)
TRIGL SERPL-MCNC: 170 MG/DL (ref 0–149)
VLDLC SERPL CALC-MCNC: 30 MG/DL (ref 5–40)

## 2021-10-02 ENCOUNTER — HEALTH MAINTENANCE LETTER (OUTPATIENT)
Age: 41
End: 2021-10-02

## 2022-05-14 ENCOUNTER — HEALTH MAINTENANCE LETTER (OUTPATIENT)
Age: 42
End: 2022-05-14

## 2022-05-16 DIAGNOSIS — I10 ESSENTIAL HYPERTENSION: ICD-10-CM

## 2022-05-16 RX ORDER — LISINOPRIL 40 MG/1
TABLET ORAL
Qty: 90 TABLET | Refills: 0 | Status: SHIPPED | OUTPATIENT
Start: 2022-05-16 | End: 2022-09-23

## 2022-05-16 NOTE — TELEPHONE ENCOUNTER
Lisinopril. Last addressed 4/30/21. Overdue for appt with fasting labs.       Essential hypertension  Essential Hypertension   Remains well controlled when checked out of clinic.   he has not experienced any significant side effects from medications for hypertension.    NO active cardiac complaints or symptoms with exercise.  Current medications for treatment:  ACE inhibitors (Lisinopril, Ramipril,Captopril,Benazepril)     Reviewed last 6 BP readings in chart:      BP Readings from Last 6 Encounters:   04/30/21 (!) 150/97   01/07/19 124/82   05/28/18 (!) 138/94   02/05/18 132/78   04/21/17 130/88   11/28/16 112/82

## 2022-06-03 ENCOUNTER — OFFICE VISIT (OUTPATIENT)
Dept: FAMILY MEDICINE | Facility: CLINIC | Age: 42
End: 2022-06-03

## 2022-06-03 VITALS
RESPIRATION RATE: 16 BRPM | HEART RATE: 91 BPM | WEIGHT: 172.31 LBS | OXYGEN SATURATION: 99 % | DIASTOLIC BLOOD PRESSURE: 82 MMHG | BODY MASS INDEX: 25.45 KG/M2 | SYSTOLIC BLOOD PRESSURE: 118 MMHG

## 2022-06-03 DIAGNOSIS — Z13.6 SCREENING FOR HEART DISEASE: ICD-10-CM

## 2022-06-03 DIAGNOSIS — Z12.5 SCREENING FOR PROSTATE CANCER: ICD-10-CM

## 2022-06-03 DIAGNOSIS — I10 ESSENTIAL HYPERTENSION: Primary | ICD-10-CM

## 2022-06-03 DIAGNOSIS — E78.00 HYPERCHOLESTEREMIA: ICD-10-CM

## 2022-06-03 DIAGNOSIS — Z00.00 ROUTINE GENERAL MEDICAL EXAMINATION AT A HEALTH CARE FACILITY: ICD-10-CM

## 2022-06-03 LAB
% GRANULOCYTES: 56.6 % (ref 42.2–75.2)
CHOL/HDL RATIO (RMG): 9.8 MG/DL (ref 0–4.5)
CHOLESTEROL: 260 MG/DL (ref 100–199)
HCT VFR BLD AUTO: 47.2 % (ref 39–51)
HDL (RMG): 26 MG/DL (ref 40–?)
HEMOGLOBIN: 16.3 G/DL (ref 13.4–17.5)
LDL CALCULATED (RMG): 193 MG/DL (ref 0–130)
LYMPHOCYTES NFR BLD AUTO: 35.2 % (ref 20.5–51.1)
MCH RBC QN AUTO: 30 PG (ref 27–31)
MCHC RBC AUTO-ENTMCNC: 34.5 G/DL (ref 33–37)
MCV RBC AUTO: 86.8 FL (ref 80–100)
MONOCYTES NFR BLD AUTO: 8.2 % (ref 1.7–9.3)
PLATELET # BLD AUTO: 208 K/UL (ref 140–450)
RBC # BLD AUTO: 5.43 X10/CMM (ref 4.2–5.9)
TRIGLYCERIDES (RMG): 200 MG/DL (ref 0–149)
WBC # BLD AUTO: 5.6 X10/CMM (ref 3.8–11)

## 2022-06-03 PROCEDURE — 99396 PREV VISIT EST AGE 40-64: CPT | Performed by: FAMILY MEDICINE

## 2022-06-03 PROCEDURE — 80061 LIPID PANEL: CPT | Mod: QW | Performed by: FAMILY MEDICINE

## 2022-06-03 PROCEDURE — 85025 COMPLETE CBC W/AUTO DIFF WBC: CPT | Performed by: FAMILY MEDICINE

## 2022-06-03 PROCEDURE — 36415 COLL VENOUS BLD VENIPUNCTURE: CPT | Performed by: FAMILY MEDICINE

## 2022-06-03 RX ORDER — ATORVASTATIN CALCIUM 10 MG/1
TABLET, FILM COATED ORAL
Qty: 90 TABLET | Refills: 3 | Status: SHIPPED | OUTPATIENT
Start: 2022-06-03 | End: 2023-06-06

## 2022-06-03 NOTE — PROGRESS NOTES
Assessment & Plan     Hypercholesteremia  Has been off the below meds restart and check levels todya  - atorvastatin (LIPITOR) 10 MG tablet; TAKE 1 TABLET BY MOUTH EVERY EVENING    Essential hypertension  Reviewed his current HTN management. Discussed our goal for him is a systolic pressure at or below 128 and diastolic pressure at or below 83.  We today managed his prescriptions with refills ensured to ensure availabilty of current medications.  Discussed the importance for aggressive management of HTN to prevent vascular complications later.  Recommended lower fat, lower carbohydrate, and lower sodium (<2000 mg)diet. Required intervals for follow up on HTN, lab studies reviewed.    Strongly recommened he follow his blood pressures outside the clinic to ensure that BPs are remaining within guidelines,.  Instructed to contact me if the readings are not within guidelines on a regular basis so we can adjust treatment as needed.    - CBC with Diff/Plt ([facility])    Screening for heart disease    - Lipid Profile ([facility])  - Comp. Metabolic Panel (14) ([facility])    Screening for prostate cancer    - PSA Serum ([facility])    Routine general medical examination at a health care facility                     No follow-ups on file.    Hemant Florian MD  [facility]    Suellen Gomez is a 41 year old who presents for the following health issues     HPI     Annual Wellness Visit    Patient has been advised of split billing requirements and indicates understanding: Yes         Physical Health:    In general, how would you rate your overall physical health? good    Outside of work, how many days during the week do you exercise?none    Outside of work, approximately how many minutes a day do you exercise?not applicable    If you drink alcohol do you typically have >3 drinks per day or >7 drinks per week? No    Do you usually eat at least 4 servings of fruit and vegetables a day, include whole grains & fiber and  "avoid regularly eating high fat or \"junk\" foods? Yes    Do you have any problems taking medications regularly? No    Do you have any side effects from medications? none    Needs assistance for the following daily activities: no assistance needed    Which of the following safety concerns are present in your home?  none identified     Hearing impairment: No    In the past 6 months, have you been bothered by leaking of urine? no    Mental Health:    In general, how would you rate your overall mental or emotional health? good  PHQ-2 Score: 0    Do you feel safe in your environment? Yes      Do you have sleep apnea, excessive snoring or daytime drowsiness?: no    Current providers sharing in care for this patient include:   Patient Care Team:  Hemant Florian MD as PCP - General (Family Practice)  Hemant Florian MD as Assigned PCP    Patient has been advised of split billing requirements and indicates understanding: Yes  Essential Hypertension   Remains well controlled when checked out of clinic.   he has not experienced any significant side effects from medications for hypertension.    NO active cardiac complaints or symptoms with exercise.  Current medications for treatment:      Reviewed last 6 BP readings in chart:  BP Readings from Last 6 Encounters:   06/03/22 118/82   04/30/21 (!) 150/97   01/07/19 124/82   05/28/18 (!) 138/94   02/05/18 132/78   04/21/17 130/88       Has history of hyperlipidemia.  Not in the past on statin for this, but reports good dietary compliance with a low chol diet.  Interested in knowing where he is at today.  Latest labs reviewed:  Recent Labs   Lab Test 04/30/21  0920 01/07/19  0815   CHOL 191 190   HDL 36* 40   * 114*   TRIG 170* 179*            Review of Systems   Constitutional, HEENT, cardiovascular, pulmonary, GI, , musculoskeletal, neuro, skin, endocrine and psych systems are negative, except as otherwise noted.      Objective    /82   Pulse 91   Resp " 16   Wt 78.2 kg (172 lb 5 oz)   SpO2 99%   BMI 25.45 kg/m    Body mass index is 25.45 kg/m .  Physical Exam   GENERAL: healthy, alert and no distress  EYES: Eyes grossly normal to inspection, PERRL and conjunctivae and sclerae normal  HENT: ear canals and TM's normal, nose and mouth without ulcers or lesions  NECK: no adenopathy, no asymmetry, masses, or scars and thyroid normal to palpation  RESP: lungs clear to auscultation - no rales, rhonchi or wheezes  CV: regular rate and rhythm, normal S1 S2, no S3 or S4, no murmur, click or rub, no peripheral edema and peripheral pulses strong  ABDOMEN: soft, nontender, no hepatosplenomegaly, no masses and bowel sounds normal   (male): normal male genitalia without lesions or urethral discharge, no hernia  MS: no gross musculoskeletal defects noted, no edema  SKIN: no suspicious lesions or rashes  NEURO: Normal strength and tone, mentation intact and speech normal  PSYCH: mentation appears normal, affect normal/bright

## 2022-06-04 LAB
ALBUMIN SERPL-MCNC: 4.3 G/DL (ref 4–5)
ALBUMIN/GLOB SERPL: 1.5 {RATIO} (ref 1.2–2.2)
ALP SERPL-CCNC: 54 IU/L (ref 44–121)
ALT SERPL-CCNC: 21 IU/L (ref 0–44)
AST SERPL-CCNC: 15 IU/L (ref 0–40)
BILIRUB SERPL-MCNC: 0.6 MG/DL (ref 0–1.2)
BUN SERPL-MCNC: 18 MG/DL (ref 6–24)
BUN/CREATININE RATIO: 15 (ref 9–20)
CALCIUM SERPL-MCNC: 9.3 MG/DL (ref 8.7–10.2)
CHLORIDE SERPLBLD-SCNC: 103 MMOL/L (ref 96–106)
CREAT SERPL-MCNC: 1.2 MG/DL (ref 0.76–1.27)
EGFR: 77 ML/MIN/1.73
GLOBULIN, TOTAL: 2.8 G/DL (ref 1.5–4.5)
GLUCOSE SERPL-MCNC: 91 MG/DL (ref 65–99)
POTASSIUM SERPL-SCNC: 4.3 MMOL/L (ref 3.5–5.2)
PROT SERPL-MCNC: 7.1 G/DL (ref 6–8.5)
PSA NG/ML: 0.5 NG/ML (ref 0–4)
SODIUM SERPL-SCNC: 139 MMOL/L (ref 134–144)
TOTAL CO2: 24 MMOL/L (ref 20–29)

## 2022-06-05 NOTE — RESULT ENCOUNTER NOTE
Dear Jason,     Looks like it will be good to get you back on your cholesterol medication.      Hemant Florian MD

## 2022-09-21 DIAGNOSIS — Z76.0 ENCOUNTER FOR MEDICATION REFILL: ICD-10-CM

## 2022-09-21 DIAGNOSIS — F41.1 GENERALIZED ANXIETY DISORDER: ICD-10-CM

## 2022-09-21 DIAGNOSIS — I10 ESSENTIAL HYPERTENSION: ICD-10-CM

## 2022-09-23 RX ORDER — LISINOPRIL 40 MG/1
40 TABLET ORAL DAILY
Qty: 90 TABLET | Refills: 0 | Status: SHIPPED | OUTPATIENT
Start: 2022-09-23 | End: 2022-12-21

## 2022-09-23 RX ORDER — ALPRAZOLAM 0.5 MG
TABLET ORAL
Qty: 30 TABLET | Refills: 0 | Status: SHIPPED | OUTPATIENT
Start: 2022-09-23 | End: 2022-12-30

## 2022-09-23 NOTE — TELEPHONE ENCOUNTER
Lisinopril refilled 90 tabs. Alprazolam refilled #30 tabs 0 refills. Needs to address further refills with PCP.  PDMP Review       Value Time User    State PDMP site checked  Yes 9/23/2022  4:27 PM Mirna Benavidez APRN CNP Jennifer Wilson, APRN CNP on 9/23/2022 at 4:27 PM

## 2022-12-21 DIAGNOSIS — I10 ESSENTIAL HYPERTENSION: ICD-10-CM

## 2022-12-22 RX ORDER — LISINOPRIL 40 MG/1
40 TABLET ORAL DAILY
Qty: 90 TABLET | Refills: 1 | Status: SHIPPED | OUTPATIENT
Start: 2022-12-22 | End: 2023-05-15

## 2022-12-30 DIAGNOSIS — Z76.0 ENCOUNTER FOR MEDICATION REFILL: ICD-10-CM

## 2022-12-30 DIAGNOSIS — F41.1 GENERALIZED ANXIETY DISORDER: ICD-10-CM

## 2022-12-30 RX ORDER — ALPRAZOLAM 0.5 MG
TABLET ORAL
Qty: 30 TABLET | Refills: 0 | Status: SHIPPED | OUTPATIENT
Start: 2022-12-30 | End: 2023-05-15

## 2023-01-17 ENCOUNTER — APPOINTMENT (OUTPATIENT)
Dept: URBAN - METROPOLITAN AREA CLINIC 256 | Age: 43
Setting detail: DERMATOLOGY
End: 2023-01-18

## 2023-01-17 ENCOUNTER — RX ONLY (RX ONLY)
Age: 43
End: 2023-01-17

## 2023-01-17 DIAGNOSIS — L82.1 OTHER SEBORRHEIC KERATOSIS: ICD-10-CM

## 2023-01-17 DIAGNOSIS — L57.0 ACTINIC KERATOSIS: ICD-10-CM

## 2023-01-17 PROCEDURE — 99203 OFFICE O/P NEW LOW 30 MIN: CPT

## 2023-01-17 PROCEDURE — OTHER PRESCRIPTION: OTHER

## 2023-01-17 PROCEDURE — OTHER PRESCRIPTION MEDICATION MANAGEMENT: OTHER

## 2023-01-17 PROCEDURE — OTHER PHOTO-DOCUMENTATION: OTHER

## 2023-01-17 PROCEDURE — OTHER MIPS QUALITY: OTHER

## 2023-01-17 PROCEDURE — OTHER COUNSELING: OTHER

## 2023-01-17 PROCEDURE — OTHER EDUCATIONAL RESOURCES PROVIDED: OTHER

## 2023-01-17 RX ORDER — FLUOROURACIL 2 G/40G
CREAM TOPICAL
Qty: 40 | Refills: 0 | Status: ERX | COMMUNITY
Start: 2023-01-17

## 2023-01-17 RX ORDER — FLUOROURACIL 2 G/40G
CREAM TOPICAL BID
Qty: 40 | Refills: 0 | Status: CANCELLED | COMMUNITY
Start: 2023-01-17

## 2023-01-17 ASSESSMENT — LOCATION DETAILED DESCRIPTION DERM
LOCATION DETAILED: LEFT LATERAL DISTAL PRETIBIAL REGION
LOCATION DETAILED: RIGHT DISTAL PRETIBIAL REGION

## 2023-01-17 ASSESSMENT — LOCATION ZONE DERM: LOCATION ZONE: LEG

## 2023-01-17 ASSESSMENT — LOCATION SIMPLE DESCRIPTION DERM
LOCATION SIMPLE: RIGHT PRETIBIAL REGION
LOCATION SIMPLE: LEFT PRETIBIAL REGION

## 2023-01-17 NOTE — HPI: EVALUATION OF SKIN LESION(S)
What Type Of Note Output Would You Prefer (Optional)?: Standard Output
Have Your Spot(S) Been Treated In The Past?: has not been treated
Hpi Title: Evaluation of a Skin Lesion
Additional History: He states when he gets out of a hot shower the spot stands out more.

## 2023-02-22 ENCOUNTER — APPOINTMENT (OUTPATIENT)
Age: 43
Setting detail: DERMATOLOGY
End: 2023-02-23

## 2023-02-22 VITALS — WEIGHT: 170 LBS | HEIGHT: 69 IN

## 2023-02-22 DIAGNOSIS — L24.4 IRRITANT CONTACT DERMATITIS DUE TO DRUGS IN CONTACT WITH SKIN: ICD-10-CM

## 2023-02-22 PROCEDURE — 99213 OFFICE O/P EST LOW 20 MIN: CPT

## 2023-02-22 PROCEDURE — OTHER MIPS QUALITY: OTHER

## 2023-02-22 PROCEDURE — OTHER COUNSELING: OTHER

## 2023-02-22 NOTE — PROCEDURE: COUNSELING
Detail Level: Detailed
Patient Specific Counseling (Will Not Stick From Patient To Patient): -Recommended patient keep shower then apply Vaseline to aid in healing.

## 2023-05-13 DIAGNOSIS — F41.1 GENERALIZED ANXIETY DISORDER: ICD-10-CM

## 2023-05-13 DIAGNOSIS — I10 ESSENTIAL HYPERTENSION: ICD-10-CM

## 2023-05-13 DIAGNOSIS — Z76.0 ENCOUNTER FOR MEDICATION REFILL: ICD-10-CM

## 2023-05-15 RX ORDER — ALPRAZOLAM 0.5 MG
TABLET ORAL
Qty: 30 TABLET | Refills: 0 | Status: SHIPPED | OUTPATIENT
Start: 2023-05-15 | End: 2023-06-12

## 2023-05-15 RX ORDER — LISINOPRIL 40 MG/1
TABLET ORAL
Qty: 30 TABLET | Refills: 0 | Status: SHIPPED | OUTPATIENT
Start: 2023-05-15 | End: 2023-06-07

## 2023-05-15 NOTE — TELEPHONE ENCOUNTER
XANAX     LOV: 6/3/22    *PATIENT NEEDS MED CHECK (CSA/PHQ9/GAD7 DUE) PLEASE CALL TO SCHEDULE THANKS        1/7/2019     7:59 AM   PATTI-7 SCORE   Total Score 11     CSA: DUE, NONE ON FILE  PHQ9/GAD7: DUE

## 2023-05-15 NOTE — TELEPHONE ENCOUNTER
LISINOPRIL     LOV: 6/3/22    BP Readings from Last 3 Encounters:   06/03/22 118/82   04/30/21 (!) 150/97   01/07/19 124/82     Essential hypertension  Reviewed his current HTN management. Discussed our goal for him is a systolic pressure at or below 128 and diastolic pressure at or below 83.  We today managed his prescriptions with refills ensured to ensure availabilty of current medications.  Discussed the importance for aggressive management of HTN to prevent vascular complications later.  Recommended lower fat, lower carbohydrate, and lower sodium (<2000 mg)diet. Required intervals for follow up on HTN, lab studies reviewed.    Strongly recommened he follow his blood pressures outside the clinic to ensure that BPs are remaining within guidelines,.  Instructed to contact me if the readings are not within guidelines on a regular basis so we can adjust treatment as needed.

## 2023-06-06 DIAGNOSIS — E78.00 HYPERCHOLESTEREMIA: ICD-10-CM

## 2023-06-06 RX ORDER — ATORVASTATIN CALCIUM 10 MG/1
TABLET, FILM COATED ORAL
Qty: 90 TABLET | Refills: 0 | Status: SHIPPED | OUTPATIENT
Start: 2023-06-06 | End: 2023-09-07

## 2023-06-07 DIAGNOSIS — I10 ESSENTIAL HYPERTENSION: ICD-10-CM

## 2023-06-07 RX ORDER — LISINOPRIL 40 MG/1
40 TABLET ORAL DAILY
Qty: 90 TABLET | Refills: 3 | Status: SHIPPED | OUTPATIENT
Start: 2023-06-07 | End: 2024-06-12

## 2023-06-07 RX ORDER — LISINOPRIL 40 MG/1
TABLET ORAL
Qty: 30 TABLET | Refills: 0 | Status: SHIPPED | OUTPATIENT
Start: 2023-06-07 | End: 2023-06-07

## 2023-06-12 ENCOUNTER — OFFICE VISIT (OUTPATIENT)
Dept: FAMILY MEDICINE | Facility: CLINIC | Age: 43
End: 2023-06-12

## 2023-06-12 VITALS
HEART RATE: 92 BPM | OXYGEN SATURATION: 98 % | WEIGHT: 176.4 LBS | SYSTOLIC BLOOD PRESSURE: 134 MMHG | HEIGHT: 68 IN | BODY MASS INDEX: 26.73 KG/M2 | DIASTOLIC BLOOD PRESSURE: 84 MMHG

## 2023-06-12 DIAGNOSIS — Z12.5 SCREENING FOR PROSTATE CANCER: ICD-10-CM

## 2023-06-12 DIAGNOSIS — I10 ESSENTIAL HYPERTENSION: ICD-10-CM

## 2023-06-12 DIAGNOSIS — F41.1 GENERALIZED ANXIETY DISORDER: ICD-10-CM

## 2023-06-12 DIAGNOSIS — Z76.0 ENCOUNTER FOR MEDICATION REFILL: ICD-10-CM

## 2023-06-12 DIAGNOSIS — Z00.00 ROUTINE GENERAL MEDICAL EXAMINATION AT A HEALTH CARE FACILITY: Primary | ICD-10-CM

## 2023-06-12 DIAGNOSIS — Z11.4 SCREENING FOR HIV WITHOUT PRESENCE OF RISK FACTORS: ICD-10-CM

## 2023-06-12 DIAGNOSIS — Z13.6 SCREENING FOR HEART DISEASE: ICD-10-CM

## 2023-06-12 DIAGNOSIS — Z11.59 ENCOUNTER FOR HCV SCREENING TEST FOR LOW RISK PATIENT: ICD-10-CM

## 2023-06-12 LAB
% GRANULOCYTES: 65.1 % (ref 42.2–75.2)
HCT VFR BLD AUTO: 48.2 % (ref 39–51)
HEMOGLOBIN: 16.4 G/DL (ref 13.4–17.5)
LYMPHOCYTES NFR BLD AUTO: 26.6 % (ref 20.5–51.1)
MCH RBC QN AUTO: 30.3 PG (ref 27–31)
MCHC RBC AUTO-ENTMCNC: 34 G/DL (ref 33–37)
MCV RBC AUTO: 89.2 FL (ref 80–100)
MONOCYTES NFR BLD AUTO: 8.3 % (ref 1.7–9.3)
PLATELET # BLD AUTO: 242 K/UL (ref 140–450)
RBC # BLD AUTO: 5.41 X10/CMM (ref 4.2–5.9)
WBC # BLD AUTO: 6.4 X10/CMM (ref 3.8–11)

## 2023-06-12 PROCEDURE — 36415 COLL VENOUS BLD VENIPUNCTURE: CPT | Performed by: FAMILY MEDICINE

## 2023-06-12 PROCEDURE — 85025 COMPLETE CBC W/AUTO DIFF WBC: CPT | Performed by: FAMILY MEDICINE

## 2023-06-12 PROCEDURE — 99396 PREV VISIT EST AGE 40-64: CPT | Performed by: FAMILY MEDICINE

## 2023-06-12 RX ORDER — ALPRAZOLAM 0.5 MG
0.5 TABLET ORAL 2 TIMES DAILY PRN
COMMUNITY
Start: 2023-06-12 | End: 2023-08-14

## 2023-06-12 NOTE — PROGRESS NOTES
"Problem(s) Oriented visit        SUBJECTIVE:                                                    Jason Israel is a 42 year old male who presents to clinic today for the following health issues :      Will do annual visit today./Problem list, Medication list, Allergies, and Medical/Social/Surgical histories reviewed in EPIC and updated as appropriate.   Additional history: as documented    ROS:  General and Resp. completed and negative except as noted above    Histories:   Patient Active Problem List   Diagnosis     Generalized anxiety disorder     Chronic rhinitis     Family history of prostate cancer     Nevus     Essential hypertension     Hypercholesterolemia     Past Surgical History:   Procedure Laterality Date     FINGER SURGERY      age 2       Social History     Tobacco Use     Smoking status: Never     Smokeless tobacco: Never   Vaping Use     Vaping status: Not on file   Substance Use Topics     Alcohol use: Yes     Alcohol/week: 0.8 standard drinks of alcohol     Types: 1 Cans of beer per week     Comment: 0-1 week     Family History   Problem Relation Age of Onset     Hypertension Mother      Prostate Cancer Father 57     Hypertension Father      Cancer Father         Skin     Polycythemia Father      No Known Problems Brother      No Known Problems Brother      Neurologic Disorder Maternal Grandmother      C.A.D. Paternal Grandmother      No Known Problems Son            OBJECTIVE:                                                    /84   Pulse 92   Ht 1.727 m (5' 8\")   Wt 80 kg (176 lb 6.4 oz)   SpO2 98%   BMI 26.82 kg/m    Body mass index is 26.82 kg/m .   APPEARANCE: = Relaxed and in no distress  Conj/Eyelids = noninjected and lids and lashes are without inflammation  PERRLA/Irises = Pupils Round Reactive to Light and Irisis without inflammation  Ears/Nose = External structures and Nares have usual shape and form  Ear canals and TM's = Canals are not inflammed and have none or little " wax and the drums are not injected and have a light reflex   Lips/Teeth/Gums = No lesions seen, good dentition, and gums seem healthy  Oropharynx = No leukoplakia, No injection to the tissues, Normal Uvula  Neck = No anterior or posterior adenopathy appreciated.  Thyroid = Not enlarged and no masses felt  Resp effort = Calm regular breathing  Breath Sounds = Good air movement with no rales or rhonchi in any lung fields  Heart Rate, Rhythm, & sounds (no Murm)  = Regular rate and rhythm with no S3, S4, or murmur appreciated.  Carotid Art's = Pulses full and equal and no bruits appreciated  Abdomen = Soft, nontender, no masses, & bowel sounds in all quadrants  Liver/Spleen = Normal span and no splenomegaly noted  = testes and penis are without lesions  Rectal = Anus without lesions, no polyps, Prostate nrl sized and no nodules  Digits and Nails = FROM in all finger joints, no nail dystrophy  Ext (edema) = No pretibial edema noted or elsewhere  Musculsktl =  Strength and ROM of major joints are within normal limits  SKIN = absent significant rashes or lesions   Recent/Remote Memory = Alert and Oriented x 3  NEURO = CN II-XII are tested and no deficits found  Mood/Affect = Cooperative and interested     ASSESSMENT/PLAN:                                                        Jason was seen today for recheck medication.    Diagnoses and all orders for this visit:    Routine general medical examination at a health care facility    Encounter for medication refill    Generalized anxiety disorder    Essential hypertension  -     CBC with Diff/Plt ([facility])    Screening for heart disease  -     Comp. Metabolic Panel (14) ([facility])  -     Lipid Panel ([facility])    Screening for prostate cancer  -     PSA Serum ([facility])    Encounter for HCV screening test for low risk patient  -     HCV Antibody ([facility])    Screening for HIV without presence of risk factors  -     HIV 1/0/2 Rflx ([facility])      See Patient Instructions  There  are no Patient Instructions on file for this visit.    The following health maintenance items are reviewed in Epic and correct as of today:  Health Maintenance   Topic Date Due     ADVANCE CARE PLANNING  Never done     HEPATITIS B IMMUNIZATION (1 of 3 - 3-dose series) Never done     HIV SCREENING  Never done     HEPATITIS C SCREENING  Never done     YEARLY PREVENTIVE VISIT  06/03/2023     LIPID  06/03/2027     DTAP/TDAP/TD IMMUNIZATION (4 - Td or Tdap) 05/28/2028     PHQ-2 (once per calendar year)  Completed     INFLUENZA VACCINE  Completed     COVID-19 Vaccine  Completed     Pneumococcal Vaccine: Pediatrics (0 to 5 Years) and At-Risk Patients (6 to 64 Years)  Aged Out     IPV IMMUNIZATION  Aged Out     MENINGITIS IMMUNIZATION  Aged Out       Hemant Florian MD  [facility]  Family Practice  [facility]  [phone]    For any issues my office # is [phone]

## 2023-06-14 LAB
ALBUMIN SERPL-MCNC: 4.9 G/DL (ref 4–5)
ALBUMIN/GLOB SERPL: 2.6 {RATIO} (ref 1.2–2.2)
ALP SERPL-CCNC: 60 IU/L (ref 44–121)
ALT SERPL-CCNC: 34 IU/L (ref 0–44)
AST SERPL-CCNC: 20 IU/L (ref 0–40)
BILIRUB SERPL-MCNC: 1 MG/DL (ref 0–1.2)
BUN SERPL-MCNC: 12 MG/DL (ref 6–24)
BUN/CREATININE RATIO: 12 (ref 9–20)
CALCIUM SERPL-MCNC: 9.6 MG/DL (ref 8.7–10.2)
CHLORIDE SERPLBLD-SCNC: 103 MMOL/L (ref 96–106)
CHOLEST SERPL-MCNC: 203 MG/DL (ref 100–199)
CREAT SERPL-MCNC: 1 MG/DL (ref 0.76–1.27)
EGFR: 96 ML/MIN/1.73
GLOBULIN, TOTAL: 1.9 G/DL (ref 1.5–4.5)
GLUCOSE SERPL-MCNC: 91 MG/DL (ref 70–99)
HCV AB SERPL QL IA: NON REACTIVE
HDLC SERPL-MCNC: 41 MG/DL
HIV SCREEN 4TH GEN WITH RFLX: NON REACTIVE
LDL/HDL RATIO: 2.9 RATIO (ref 0–3.6)
LDLC SERPL CALC-MCNC: 120 MG/DL (ref 0–99)
POTASSIUM SERPL-SCNC: 4.3 MMOL/L (ref 3.5–5.2)
PROT SERPL-MCNC: 6.8 G/DL (ref 6–8.5)
PSA NG/ML: 0.3 NG/ML (ref 0–4)
SODIUM SERPL-SCNC: 140 MMOL/L (ref 134–144)
TOTAL CO2: 24 MMOL/L (ref 20–29)
TRIGL SERPL-MCNC: 238 MG/DL (ref 0–149)
VLDLC SERPL CALC-MCNC: 42 MG/DL (ref 5–40)

## 2023-06-14 NOTE — RESULT ENCOUNTER NOTE
Dear Jason,     I am writing to report that your included test results are as expected.    Many labs contain some results that are slightly outside of the normal range, I have reviewed any of these results and they require no changes at this time.    Hemant Florian MD

## 2023-08-14 ENCOUNTER — MYC REFILL (OUTPATIENT)
Dept: FAMILY MEDICINE | Facility: CLINIC | Age: 43
End: 2023-08-14

## 2023-08-14 DIAGNOSIS — F41.1 GENERALIZED ANXIETY DISORDER: ICD-10-CM

## 2023-08-15 RX ORDER — ALPRAZOLAM 0.5 MG
0.5 TABLET ORAL 2 TIMES DAILY PRN
Qty: 60 TABLET | Refills: 0 | Status: SHIPPED | OUTPATIENT
Start: 2023-08-15 | End: 2024-02-28

## 2023-08-15 NOTE — CONFIDENTIAL NOTE
CONTROLLED SUBSTANCE REFILL REQUEST FOR ALPRAZOLAM  DOSE: 0.5 MG - # 60  SIG: TAKE 1 TAB BID PRN FOR ANXIETY      LAST REFILL: 5/16/23    LAST APPT RELATED: 6/12/23    NEXT APPT: NONE      CONTROLLED SUBSTANCE AGREEMENT: DUE    URINE DRUG SCREEN: DUE        FILL HISTORY PER :          REFILL ROUTED TO DR HAYS FOR REVIEW    Myra Broderick, [facility]

## 2023-09-07 DIAGNOSIS — E78.00 HYPERCHOLESTEREMIA: ICD-10-CM

## 2023-09-07 RX ORDER — ATORVASTATIN CALCIUM 10 MG/1
TABLET, FILM COATED ORAL
Qty: 90 TABLET | Refills: 0 | Status: SHIPPED | OUTPATIENT
Start: 2023-09-07 | End: 2023-12-11

## 2023-09-07 NOTE — TELEPHONE ENCOUNTER
Med: Atorvastatin        LOV (related): 6/12/23    Last Lab: 6/12/23      Due for F/U around: None       Next Appt: None           Cholesterol   Date Value Ref Range Status   06/12/2023 203 (H) 100 - 199 mg/dL Final   06/03/2022 260 (A) 100 - 199 mg/dl Final   04/30/2021 191 100 - 199 mg/dL Final     HDL Cholesterol   Date Value Ref Range Status   06/12/2023 41 >39 mg/dL Final   04/30/2021 36 (L) >39 mg/dL Final     HDL   Date Value Ref Range Status   06/03/2022 26 (A) 40 mg/dl Final     LDL Cholesterol Calculated   Date Value Ref Range Status   06/12/2023 120 (H) 0 - 99 mg/dL Final   04/30/2021 125 (H) 0 - 99 mg/dL Final     LDL CALCULATED ([facility])   Date Value Ref Range Status   06/03/2022 193 (A) 0 - 130 mg/dl Final     Triglycerides   Date Value Ref Range Status   06/12/2023 238 (H) 0 - 149 mg/dL Final   06/03/2022 200 (A) 0 - 149 mg/dl Final   04/30/2021 170 (H) 0 - 149 mg/dL Final     No results found for: CHOLHDLRATIO

## 2023-11-21 ENCOUNTER — APPOINTMENT (OUTPATIENT)
Dept: URBAN - METROPOLITAN AREA CLINIC 255 | Age: 43
Setting detail: DERMATOLOGY
End: 2023-11-21

## 2023-11-21 VITALS — HEIGHT: 69 IN | WEIGHT: 170 LBS

## 2023-11-21 DIAGNOSIS — I78.8 OTHER DISEASES OF CAPILLARIES: ICD-10-CM

## 2023-11-21 DIAGNOSIS — L82.1 OTHER SEBORRHEIC KERATOSIS: ICD-10-CM

## 2023-11-21 DIAGNOSIS — D49.2 NEOPLASM OF UNSPECIFIED BEHAVIOR OF BONE, SOFT TISSUE, AND SKIN: ICD-10-CM

## 2023-11-21 DIAGNOSIS — L57.0 ACTINIC KERATOSIS: ICD-10-CM

## 2023-11-21 DIAGNOSIS — L73.8 OTHER SPECIFIED FOLLICULAR DISORDERS: ICD-10-CM

## 2023-11-21 PROCEDURE — 99213 OFFICE O/P EST LOW 20 MIN: CPT | Mod: 25

## 2023-11-21 PROCEDURE — OTHER BIOPSY BY SHAVE METHOD: OTHER

## 2023-11-21 PROCEDURE — OTHER MIPS QUALITY: OTHER

## 2023-11-21 PROCEDURE — 17000 DESTRUCT PREMALG LESION: CPT | Mod: 59

## 2023-11-21 PROCEDURE — OTHER COUNSELING: OTHER

## 2023-11-21 PROCEDURE — OTHER DEFER: OTHER

## 2023-11-21 PROCEDURE — OTHER LIQUID NITROGEN: OTHER

## 2023-11-21 PROCEDURE — 11102 TANGNTL BX SKIN SINGLE LES: CPT

## 2023-11-21 ASSESSMENT — LOCATION SIMPLE DESCRIPTION DERM
LOCATION SIMPLE: LEFT PRETIBIAL REGION
LOCATION SIMPLE: LEFT FOREARM
LOCATION SIMPLE: RIGHT PRETIBIAL REGION
LOCATION SIMPLE: RIGHT FOREHEAD
LOCATION SIMPLE: LEFT CHEEK

## 2023-11-21 ASSESSMENT — LOCATION DETAILED DESCRIPTION DERM
LOCATION DETAILED: LEFT LATERAL DISTAL PRETIBIAL REGION
LOCATION DETAILED: RIGHT MEDIAL FOREHEAD
LOCATION DETAILED: LEFT DISTAL RADIAL DORSAL FOREARM
LOCATION DETAILED: LEFT CENTRAL MALAR CHEEK
LOCATION DETAILED: LEFT DISTAL DORSAL FOREARM
LOCATION DETAILED: RIGHT DISTAL PRETIBIAL REGION
LOCATION DETAILED: LEFT INFERIOR CENTRAL MALAR CHEEK

## 2023-11-21 ASSESSMENT — LOCATION ZONE DERM
LOCATION ZONE: LEG
LOCATION ZONE: FACE
LOCATION ZONE: ARM

## 2023-11-21 NOTE — HPI: SKIN LESION
What Type Of Note Output Would You Prefer (Optional)?: Standard Output
How Severe Is Your Skin Lesion?: mild
Has Your Skin Lesion Been Treated?: not been treated
Is This A New Presentation, Or A Follow-Up?: Skin Lesion
Additional History: \\n\\nPatient would like to follow up on his face. Previously he did Efudex 5% cream twice daily on the face for 2 weeks. On February 2023 patient had follow up with Dr. Sd Amato via telehealth. Patient had a mild reaction and wanted to see if all spots were resolved.
Which Family Member (Optional)?: Father

## 2023-11-21 NOTE — PROCEDURE: LIQUID NITROGEN
Render Note In Bullet Format When Appropriate: Yes
Application Tool (Optional): Liquid Nitrogen Sprayer
Detail Level: Detailed
Consent: - Discussed that these are a result of cumulative sun exposure.\\n- Verbal and written consent was obtained, and risks were reviewed prior to procedure today. \\n- Risks discussed include but are not limited to pain, crusting, scabbing, blistering, scarring, temporary or permanent darker or lighter pigmentary change, recurrence, incomplete resolution, and infection.
Duration Of Freeze Thaw-Cycle (Seconds): 0
Show Aperture Variable?: No
Post-Care Instructions: - Patient was instructed to avoid picking at any of the treated lesions.

## 2023-12-10 DIAGNOSIS — E78.00 HYPERCHOLESTEREMIA: ICD-10-CM

## 2023-12-11 RX ORDER — ATORVASTATIN CALCIUM 10 MG/1
TABLET, FILM COATED ORAL
Qty: 90 TABLET | Refills: 1 | Status: SHIPPED | OUTPATIENT
Start: 2023-12-11 | End: 2024-06-12

## 2023-12-11 NOTE — CONFIDENTIAL NOTE
"Med: ATORVASTATIN    LOV (related): 6/12/23 - CPX    Last Lab: 6/12/23      Due for F/U around: 6/2024 FOR CPX    Next Appt: NONE        Cholesterol   Date Value Ref Range Status   06/12/2023 203 (H) 100 - 199 mg/dL Final   06/03/2022 260 (A) 100 - 199 mg/dl Final   04/30/2021 191 100 - 199 mg/dL Final     HDL Cholesterol   Date Value Ref Range Status   06/12/2023 41 >39 mg/dL Final   04/30/2021 36 (L) >39 mg/dL Final     HDL   Date Value Ref Range Status   06/03/2022 26 (A) 40 mg/dl Final     LDL Cholesterol Calculated   Date Value Ref Range Status   06/12/2023 120 (H) 0 - 99 mg/dL Final   04/30/2021 125 (H) 0 - 99 mg/dL Final     LDL CALCULATED ([facility])   Date Value Ref Range Status   06/03/2022 193 (A) 0 - 130 mg/dl Final     Triglycerides   Date Value Ref Range Status   06/12/2023 238 (H) 0 - 149 mg/dL Final   06/03/2022 200 (A) 0 - 149 mg/dl Final   04/30/2021 170 (H) 0 - 149 mg/dL Final     No results found for: \"CHOLHDLRATIO\"     "

## 2024-02-27 DIAGNOSIS — F41.1 GENERALIZED ANXIETY DISORDER: ICD-10-CM

## 2024-02-27 NOTE — TELEPHONE ENCOUNTER
CONTROLLED SUBSTANCE REFILL REQUEST FOR Alprazolam    DOSE: 0.5 mg - # 60  SIG: Take 1 tablet (0.5 mg) by mouth 2 times daily as needed for anxiety AS NEEDED FOR ANXIETY - Oral       LAST REFILL: 8/15/23    LAST APPT RELATED: 3/12/23    NEXT APPT: None         CONTROLLED SUBSTANCE AGREEMENT: 1/7/2019    URINE DRUG SCREEN: None           FILL HISTORY PER :          REFILL ROUTED TO KN FOR REVIEW

## 2024-02-28 RX ORDER — ALPRAZOLAM 0.5 MG
0.5 TABLET ORAL 2 TIMES DAILY PRN
Qty: 60 TABLET | Refills: 0 | Status: SHIPPED | OUTPATIENT
Start: 2024-02-28

## 2024-06-12 DIAGNOSIS — E78.00 HYPERCHOLESTEREMIA: ICD-10-CM

## 2024-06-12 DIAGNOSIS — I10 ESSENTIAL HYPERTENSION: ICD-10-CM

## 2024-06-12 RX ORDER — ATORVASTATIN CALCIUM 10 MG/1
TABLET, FILM COATED ORAL
Qty: 90 TABLET | Refills: 0 | Status: SHIPPED | OUTPATIENT
Start: 2024-06-12 | End: 2024-09-19

## 2024-06-12 RX ORDER — LISINOPRIL 40 MG/1
40 TABLET ORAL DAILY
Qty: 90 TABLET | Refills: 0 | Status: SHIPPED | OUTPATIENT
Start: 2024-06-12 | End: 2024-09-19

## 2024-06-12 NOTE — CONFIDENTIAL NOTE
"Med: LISINOPRIL & ATORVASTATIN    LOV (related): 6/12/23 - CPX    Last Lab: 6/12/23      Due for F/U around: 6/2024 FOR CPX    Next Appt: NONE        BP Readings from Last 3 Encounters:   06/12/23 134/84   06/03/22 118/82   04/30/21 (!) 150/97       Last Comprehensive Metabolic Panel:  Lab Results   Component Value Date     06/12/2023    POTASSIUM 4.3 06/12/2023    CHLORIDE 103 06/12/2023    GLC 91 06/12/2023    BUN 12 06/12/2023    BUN 12 06/12/2023    CR 1.00 06/12/2023    SHANICE 9.6 06/12/2023         Lab Results   Component Value Date    CHOL 203 06/12/2023    CHOL 260 06/03/2022    CHOL 191 04/30/2021     Lab Results   Component Value Date    HDL 41 06/12/2023    HDL 26 06/03/2022    HDL 36 04/30/2021     Lab Results   Component Value Date     06/12/2023     06/03/2022     04/30/2021     Lab Results   Component Value Date    TRIG 238 06/12/2023    TRIG 200 06/03/2022    TRIG 170 04/30/2021     No results found for: \"CHOLHDLRATIO\"    "

## 2024-09-14 ENCOUNTER — HEALTH MAINTENANCE LETTER (OUTPATIENT)
Age: 44
End: 2024-09-14

## 2024-09-19 DIAGNOSIS — I10 ESSENTIAL HYPERTENSION: ICD-10-CM

## 2024-09-19 DIAGNOSIS — E78.00 HYPERCHOLESTEREMIA: ICD-10-CM

## 2024-09-19 RX ORDER — LISINOPRIL 40 MG/1
40 TABLET ORAL DAILY
Qty: 90 TABLET | Refills: 0 | Status: SHIPPED | OUTPATIENT
Start: 2024-09-19

## 2024-09-19 RX ORDER — ATORVASTATIN CALCIUM 10 MG/1
TABLET, FILM COATED ORAL
Qty: 90 TABLET | Refills: 0 | Status: SHIPPED | OUTPATIENT
Start: 2024-09-19

## 2024-12-22 DIAGNOSIS — I10 ESSENTIAL HYPERTENSION: ICD-10-CM

## 2024-12-22 DIAGNOSIS — E78.00 HYPERCHOLESTEREMIA: ICD-10-CM

## 2024-12-22 RX ORDER — ATORVASTATIN CALCIUM 10 MG/1
TABLET, FILM COATED ORAL
Qty: 90 TABLET | Refills: 0 | Status: SHIPPED | OUTPATIENT
Start: 2024-12-22

## 2024-12-22 RX ORDER — LISINOPRIL 40 MG/1
40 TABLET ORAL DAILY
Qty: 90 TABLET | Refills: 0 | Status: SHIPPED | OUTPATIENT
Start: 2024-12-22

## 2025-03-10 ENCOUNTER — APPOINTMENT (OUTPATIENT)
Dept: URBAN - METROPOLITAN AREA CLINIC 256 | Age: 45
Setting detail: DERMATOLOGY
End: 2025-03-11

## 2025-03-10 VITALS — WEIGHT: 170 LBS | HEIGHT: 68 IN

## 2025-03-10 DIAGNOSIS — L57.0 ACTINIC KERATOSIS: ICD-10-CM

## 2025-03-10 DIAGNOSIS — L73.8 OTHER SPECIFIED FOLLICULAR DISORDERS: ICD-10-CM

## 2025-03-10 DIAGNOSIS — I78.8 OTHER DISEASES OF CAPILLARIES: ICD-10-CM

## 2025-03-10 DIAGNOSIS — L82.1 OTHER SEBORRHEIC KERATOSIS: ICD-10-CM

## 2025-03-10 DIAGNOSIS — L72.0 EPIDERMAL CYST: ICD-10-CM

## 2025-03-10 PROCEDURE — 99213 OFFICE O/P EST LOW 20 MIN: CPT

## 2025-03-10 PROCEDURE — OTHER COUNSELING: OTHER

## 2025-03-10 PROCEDURE — OTHER MIPS QUALITY: OTHER

## 2025-03-10 PROCEDURE — OTHER PATIENT SPECIFIC COUNSELING: OTHER

## 2025-03-10 ASSESSMENT — LOCATION DETAILED DESCRIPTION DERM
LOCATION DETAILED: RIGHT CENTRAL MALAR CHEEK
LOCATION DETAILED: LEFT CENTRAL MALAR CHEEK
LOCATION DETAILED: LEFT MEDIAL MALAR CHEEK
LOCATION DETAILED: RIGHT DISTAL PRETIBIAL REGION
LOCATION DETAILED: LEFT SUPERIOR LATERAL MALAR CHEEK
LOCATION DETAILED: LEFT SUPERIOR MEDIAL MALAR CHEEK

## 2025-03-10 ASSESSMENT — LOCATION ZONE DERM
LOCATION ZONE: LEG
LOCATION ZONE: FACE

## 2025-03-10 ASSESSMENT — LOCATION SIMPLE DESCRIPTION DERM
LOCATION SIMPLE: LEFT CHEEK
LOCATION SIMPLE: RIGHT PRETIBIAL REGION
LOCATION SIMPLE: RIGHT CHEEK

## 2025-03-10 NOTE — PROCEDURE: PATIENT SPECIFIC COUNSELING
Received report from day shift nurse, Sharmila CHILD. Assumed pt care at 1915.   Pt is A&Ox4, resting comfortably in bed. Pt on room air; no signs of SOB/respiratory distress. Labs noted, VSS. Pt denies pain at this time. Review plan of care to pt. Encouraged pt regarding importance of early ambulation; pt states understanding. Needs attended well. Fall precautions in place. Bed at lowest position. Call light and personal belongings within reach. Encouraged to call for assistance. Plan of care on going, no further concerns as of present.   Hourly rounding in progress.   
Detail Level: Zone
Discussed success of previous Efudex treatment. Assured no need for re-initiation of treatment at this time. Discussed patient is showing signs of early AKs, meaning he will likely re-initiate treatment in the future. Patient expressed understanding.